# Patient Record
Sex: FEMALE | Race: BLACK OR AFRICAN AMERICAN | Employment: FULL TIME | URBAN - METROPOLITAN AREA
[De-identification: names, ages, dates, MRNs, and addresses within clinical notes are randomized per-mention and may not be internally consistent; named-entity substitution may affect disease eponyms.]

---

## 2019-06-18 ENCOUNTER — ANESTHESIA EVENT (OUTPATIENT)
Dept: ENDOSCOPY | Age: 39
DRG: 379 | End: 2019-06-18
Payer: COMMERCIAL

## 2019-06-18 ENCOUNTER — APPOINTMENT (OUTPATIENT)
Dept: GENERAL RADIOLOGY | Age: 39
DRG: 379 | End: 2019-06-18
Attending: INTERNAL MEDICINE
Payer: COMMERCIAL

## 2019-06-18 ENCOUNTER — HOSPITAL ENCOUNTER (INPATIENT)
Age: 39
LOS: 1 days | Discharge: HOME | DRG: 379 | End: 2019-06-21
Attending: EMERGENCY MEDICINE | Admitting: FAMILY MEDICINE
Payer: COMMERCIAL

## 2019-06-18 ENCOUNTER — ANESTHESIA (OUTPATIENT)
Dept: ENDOSCOPY | Age: 39
DRG: 379 | End: 2019-06-18
Payer: COMMERCIAL

## 2019-06-18 DIAGNOSIS — K92.2 UGI BLEED: Primary | ICD-10-CM

## 2019-06-18 PROBLEM — D72.829 LEUKOCYTOSIS: Status: ACTIVE | Noted: 2019-06-18

## 2019-06-18 PROBLEM — K92.0 COFFEE GROUND EMESIS: Status: ACTIVE | Noted: 2019-06-18

## 2019-06-18 LAB
ALBUMIN SERPL-MCNC: 3.9 G/DL (ref 3.5–5)
ALBUMIN/GLOB SERPL: 1 {RATIO} (ref 1.1–2.2)
ALP SERPL-CCNC: 60 U/L (ref 45–117)
ALT SERPL-CCNC: 21 U/L (ref 12–78)
ANION GAP SERPL CALC-SCNC: 3 MMOL/L (ref 5–15)
APPEARANCE UR: CLEAR
AST SERPL-CCNC: 13 U/L (ref 15–37)
ATRIAL RATE: 94 BPM
BACTERIA URNS QL MICRO: NEGATIVE /HPF
BASOPHILS # BLD: 0 K/UL (ref 0–0.1)
BASOPHILS NFR BLD: 0 % (ref 0–1)
BILIRUB SERPL-MCNC: 0.3 MG/DL (ref 0.2–1)
BILIRUB UR QL: NEGATIVE
BUN SERPL-MCNC: 25 MG/DL (ref 6–20)
BUN/CREAT SERPL: 39 (ref 12–20)
CALCIUM SERPL-MCNC: 9 MG/DL (ref 8.5–10.1)
CALCULATED P AXIS, ECG09: 49 DEGREES
CALCULATED R AXIS, ECG10: 21 DEGREES
CALCULATED T AXIS, ECG11: 33 DEGREES
CHLORIDE SERPL-SCNC: 110 MMOL/L (ref 97–108)
CO2 SERPL-SCNC: 29 MMOL/L (ref 21–32)
COLOR UR: ABNORMAL
CREAT SERPL-MCNC: 0.64 MG/DL (ref 0.55–1.02)
DIAGNOSIS, 93000: NORMAL
DIFFERENTIAL METHOD BLD: ABNORMAL
EOSINOPHIL # BLD: 0.1 K/UL (ref 0–0.4)
EOSINOPHIL NFR BLD: 0 % (ref 0–7)
EPITH CASTS URNS QL MICRO: ABNORMAL /LPF
ERYTHROCYTE [DISTWIDTH] IN BLOOD BY AUTOMATED COUNT: 12.8 % (ref 11.5–14.5)
GLOBULIN SER CALC-MCNC: 3.8 G/DL (ref 2–4)
GLUCOSE SERPL-MCNC: 115 MG/DL (ref 65–100)
GLUCOSE UR STRIP.AUTO-MCNC: NEGATIVE MG/DL
HCG UR QL: NEGATIVE
HCT VFR BLD AUTO: 34.8 % (ref 35–47)
HEMOCCULT STL QL: NEGATIVE
HGB BLD-MCNC: 11.7 G/DL (ref 11.5–16)
HGB UR QL STRIP: NEGATIVE
HYALINE CASTS URNS QL MICRO: ABNORMAL /LPF (ref 0–5)
IMM GRANULOCYTES # BLD AUTO: 0 K/UL (ref 0–0.04)
IMM GRANULOCYTES NFR BLD AUTO: 0 % (ref 0–0.5)
KETONES UR QL STRIP.AUTO: 40 MG/DL
LEUKOCYTE ESTERASE UR QL STRIP.AUTO: ABNORMAL
LIPASE SERPL-CCNC: 93 U/L (ref 73–393)
LYMPHOCYTES # BLD: 2.2 K/UL (ref 0.8–3.5)
LYMPHOCYTES NFR BLD: 16 % (ref 12–49)
MCH RBC QN AUTO: 28.7 PG (ref 26–34)
MCHC RBC AUTO-ENTMCNC: 33.6 G/DL (ref 30–36.5)
MCV RBC AUTO: 85.3 FL (ref 80–99)
MONOCYTES # BLD: 0.7 K/UL (ref 0–1)
MONOCYTES NFR BLD: 5 % (ref 5–13)
NEUTS SEG # BLD: 10.5 K/UL (ref 1.8–8)
NEUTS SEG NFR BLD: 79 % (ref 32–75)
NITRITE UR QL STRIP.AUTO: NEGATIVE
NRBC # BLD: 0 K/UL (ref 0–0.01)
NRBC BLD-RTO: 0 PER 100 WBC
P-R INTERVAL, ECG05: 168 MS
PH UR STRIP: 7 [PH] (ref 5–8)
PLATELET # BLD AUTO: 304 K/UL (ref 150–400)
PMV BLD AUTO: 10.2 FL (ref 8.9–12.9)
POTASSIUM SERPL-SCNC: 4.1 MMOL/L (ref 3.5–5.1)
PROT SERPL-MCNC: 7.7 G/DL (ref 6.4–8.2)
PROT UR STRIP-MCNC: ABNORMAL MG/DL
Q-T INTERVAL, ECG07: 360 MS
QRS DURATION, ECG06: 82 MS
QTC CALCULATION (BEZET), ECG08: 450 MS
RBC # BLD AUTO: 4.08 M/UL (ref 3.8–5.2)
RBC #/AREA URNS HPF: ABNORMAL /HPF (ref 0–5)
SODIUM SERPL-SCNC: 142 MMOL/L (ref 136–145)
SP GR UR REFRACTOMETRY: 1.02 (ref 1–1.03)
UR CULT HOLD, URHOLD: NORMAL
UROBILINOGEN UR QL STRIP.AUTO: 1 EU/DL (ref 0.2–1)
VENTRICULAR RATE, ECG03: 94 BPM
WBC # BLD AUTO: 13.5 K/UL (ref 3.6–11)
WBC URNS QL MICRO: ABNORMAL /HPF (ref 0–4)

## 2019-06-18 PROCEDURE — 74011250636 HC RX REV CODE- 250/636: Performed by: EMERGENCY MEDICINE

## 2019-06-18 PROCEDURE — C9113 INJ PANTOPRAZOLE SODIUM, VIA: HCPCS | Performed by: EMERGENCY MEDICINE

## 2019-06-18 PROCEDURE — 74011250636 HC RX REV CODE- 250/636: Performed by: INTERNAL MEDICINE

## 2019-06-18 PROCEDURE — 80053 COMPREHEN METABOLIC PANEL: CPT

## 2019-06-18 PROCEDURE — 99218 HC RM OBSERVATION: CPT

## 2019-06-18 PROCEDURE — 96361 HYDRATE IV INFUSION ADD-ON: CPT

## 2019-06-18 PROCEDURE — 81001 URINALYSIS AUTO W/SCOPE: CPT

## 2019-06-18 PROCEDURE — 82272 OCCULT BLD FECES 1-3 TESTS: CPT

## 2019-06-18 PROCEDURE — 94762 N-INVAS EAR/PLS OXIMTRY CONT: CPT

## 2019-06-18 PROCEDURE — 96374 THER/PROPH/DIAG INJ IV PUSH: CPT

## 2019-06-18 PROCEDURE — 85025 COMPLETE CBC W/AUTO DIFF WBC: CPT

## 2019-06-18 PROCEDURE — 74011250636 HC RX REV CODE- 250/636

## 2019-06-18 PROCEDURE — 74011250636 HC RX REV CODE- 250/636: Performed by: SPECIALIST

## 2019-06-18 PROCEDURE — 77030003657 HC NDL SCLER BSC -B: Performed by: SPECIALIST

## 2019-06-18 PROCEDURE — 83690 ASSAY OF LIPASE: CPT

## 2019-06-18 PROCEDURE — 77030004927 HC CATH ELECHEMSTAS BSC -C: Performed by: SPECIALIST

## 2019-06-18 PROCEDURE — 71045 X-RAY EXAM CHEST 1 VIEW: CPT

## 2019-06-18 PROCEDURE — 76060000031 HC ANESTHESIA FIRST 0.5 HR: Performed by: SPECIALIST

## 2019-06-18 PROCEDURE — 93005 ELECTROCARDIOGRAM TRACING: CPT

## 2019-06-18 PROCEDURE — 81025 URINE PREGNANCY TEST: CPT

## 2019-06-18 PROCEDURE — 86900 BLOOD TYPING SEROLOGIC ABO: CPT

## 2019-06-18 PROCEDURE — 36415 COLL VENOUS BLD VENIPUNCTURE: CPT

## 2019-06-18 PROCEDURE — 0W3P8ZZ CONTROL BLEEDING IN GASTROINTESTINAL TRACT, VIA NATURAL OR ARTIFICIAL OPENING ENDOSCOPIC: ICD-10-PCS | Performed by: SPECIALIST

## 2019-06-18 PROCEDURE — C9113 INJ PANTOPRAZOLE SODIUM, VIA: HCPCS | Performed by: INTERNAL MEDICINE

## 2019-06-18 PROCEDURE — 99285 EMERGENCY DEPT VISIT HI MDM: CPT

## 2019-06-18 PROCEDURE — 76040000019: Performed by: SPECIALIST

## 2019-06-18 RX ORDER — SODIUM CHLORIDE 0.9 % (FLUSH) 0.9 %
5-40 SYRINGE (ML) INJECTION AS NEEDED
Status: DISCONTINUED | OUTPATIENT
Start: 2019-06-18 | End: 2019-06-21 | Stop reason: HOSPADM

## 2019-06-18 RX ORDER — SODIUM CHLORIDE 9 MG/ML
INJECTION, SOLUTION INTRAVENOUS
Status: DISCONTINUED | OUTPATIENT
Start: 2019-06-18 | End: 2019-06-18 | Stop reason: HOSPADM

## 2019-06-18 RX ORDER — ACETAMINOPHEN 325 MG/1
650 TABLET ORAL
Status: DISCONTINUED | OUTPATIENT
Start: 2019-06-18 | End: 2019-06-21 | Stop reason: HOSPADM

## 2019-06-18 RX ORDER — FLUMAZENIL 0.1 MG/ML
0.2 INJECTION INTRAVENOUS
Status: DISCONTINUED | OUTPATIENT
Start: 2019-06-18 | End: 2019-06-18 | Stop reason: HOSPADM

## 2019-06-18 RX ORDER — EPINEPHRINE 0.1 MG/ML
INJECTION INTRACARDIAC; INTRAVENOUS
Status: DISCONTINUED
Start: 2019-06-18 | End: 2019-06-18

## 2019-06-18 RX ORDER — DEXLANSOPRAZOLE 60 MG/1
60 CAPSULE, DELAYED RELEASE ORAL
COMMUNITY
End: 2019-06-21

## 2019-06-18 RX ORDER — NALOXONE HYDROCHLORIDE 0.4 MG/ML
0.4 INJECTION, SOLUTION INTRAMUSCULAR; INTRAVENOUS; SUBCUTANEOUS AS NEEDED
Status: DISCONTINUED | OUTPATIENT
Start: 2019-06-18 | End: 2019-06-21 | Stop reason: HOSPADM

## 2019-06-18 RX ORDER — FENTANYL CITRATE 50 UG/ML
25 INJECTION, SOLUTION INTRAMUSCULAR; INTRAVENOUS AS NEEDED
Status: DISCONTINUED | OUTPATIENT
Start: 2019-06-18 | End: 2019-06-18 | Stop reason: HOSPADM

## 2019-06-18 RX ORDER — MINERAL OIL
180 ENEMA (ML) RECTAL DAILY
COMMUNITY

## 2019-06-18 RX ORDER — ONDANSETRON 4 MG/1
4 TABLET, ORALLY DISINTEGRATING ORAL
Status: DISCONTINUED | OUTPATIENT
Start: 2019-06-18 | End: 2019-06-21 | Stop reason: HOSPADM

## 2019-06-18 RX ORDER — MIDAZOLAM HYDROCHLORIDE 1 MG/ML
.25-5 INJECTION, SOLUTION INTRAMUSCULAR; INTRAVENOUS AS NEEDED
Status: DISCONTINUED | OUTPATIENT
Start: 2019-06-18 | End: 2019-06-18 | Stop reason: HOSPADM

## 2019-06-18 RX ORDER — DEXTROMETHORPHAN/PSEUDOEPHED 2.5-7.5/.8
1.2 DROPS ORAL
Status: DISCONTINUED | OUTPATIENT
Start: 2019-06-18 | End: 2019-06-18 | Stop reason: HOSPADM

## 2019-06-18 RX ORDER — PANTOPRAZOLE SODIUM 40 MG/10ML
40 INJECTION, POWDER, LYOPHILIZED, FOR SOLUTION INTRAVENOUS
Status: COMPLETED | OUTPATIENT
Start: 2019-06-18 | End: 2019-06-18

## 2019-06-18 RX ORDER — ACETAMINOPHEN 325 MG/1
325 TABLET ORAL
COMMUNITY

## 2019-06-18 RX ORDER — SODIUM CHLORIDE 9 MG/ML
100 INJECTION, SOLUTION INTRAVENOUS CONTINUOUS
Status: DISPENSED | OUTPATIENT
Start: 2019-06-18 | End: 2019-06-19

## 2019-06-18 RX ORDER — NALOXONE HYDROCHLORIDE 0.4 MG/ML
0.4 INJECTION, SOLUTION INTRAMUSCULAR; INTRAVENOUS; SUBCUTANEOUS
Status: DISCONTINUED | OUTPATIENT
Start: 2019-06-18 | End: 2019-06-18 | Stop reason: HOSPADM

## 2019-06-18 RX ORDER — PROPOFOL 10 MG/ML
INJECTION, EMULSION INTRAVENOUS
Status: DISCONTINUED | OUTPATIENT
Start: 2019-06-18 | End: 2019-06-18 | Stop reason: HOSPADM

## 2019-06-18 RX ORDER — SODIUM CHLORIDE 9 MG/ML
50 INJECTION, SOLUTION INTRAVENOUS CONTINUOUS
Status: DISPENSED | OUTPATIENT
Start: 2019-06-18 | End: 2019-06-18

## 2019-06-18 RX ORDER — PANTOPRAZOLE SODIUM 40 MG/10ML
80 INJECTION, POWDER, LYOPHILIZED, FOR SOLUTION INTRAVENOUS
Status: DISCONTINUED | OUTPATIENT
Start: 2019-06-18 | End: 2019-06-18

## 2019-06-18 RX ORDER — PROPOFOL 10 MG/ML
INJECTION, EMULSION INTRAVENOUS AS NEEDED
Status: DISCONTINUED | OUTPATIENT
Start: 2019-06-18 | End: 2019-06-18 | Stop reason: HOSPADM

## 2019-06-18 RX ORDER — LIDOCAINE HYDROCHLORIDE 20 MG/ML
INJECTION, SOLUTION EPIDURAL; INFILTRATION; INTRACAUDAL; PERINEURAL AS NEEDED
Status: DISCONTINUED | OUTPATIENT
Start: 2019-06-18 | End: 2019-06-18 | Stop reason: HOSPADM

## 2019-06-18 RX ORDER — EPINEPHRINE 0.1 MG/ML
1 INJECTION INTRACARDIAC; INTRAVENOUS AS NEEDED
Status: DISCONTINUED | OUTPATIENT
Start: 2019-06-18 | End: 2019-06-21 | Stop reason: HOSPADM

## 2019-06-18 RX ORDER — SODIUM CHLORIDE 0.9 % (FLUSH) 0.9 %
5-40 SYRINGE (ML) INJECTION EVERY 8 HOURS
Status: DISCONTINUED | OUTPATIENT
Start: 2019-06-18 | End: 2019-06-21 | Stop reason: HOSPADM

## 2019-06-18 RX ADMIN — PANTOPRAZOLE SODIUM 40 MG: 40 INJECTION, POWDER, FOR SOLUTION INTRAVENOUS at 20:52

## 2019-06-18 RX ADMIN — LIDOCAINE HYDROCHLORIDE 100 MG: 20 INJECTION, SOLUTION EPIDURAL; INFILTRATION; INTRACAUDAL; PERINEURAL at 16:41

## 2019-06-18 RX ADMIN — PROPOFOL 30 MG: 10 INJECTION, EMULSION INTRAVENOUS at 16:42

## 2019-06-18 RX ADMIN — EPINEPHRINE 5 MG: 0.1 INJECTION INTRACARDIAC; INTRAVENOUS at 16:58

## 2019-06-18 RX ADMIN — SODIUM CHLORIDE 125 ML/HR: 9 INJECTION, SOLUTION INTRAVENOUS at 13:58

## 2019-06-18 RX ADMIN — PROPOFOL 30 MG: 10 INJECTION, EMULSION INTRAVENOUS at 16:46

## 2019-06-18 RX ADMIN — SODIUM CHLORIDE: 9 INJECTION, SOLUTION INTRAVENOUS at 16:35

## 2019-06-18 RX ADMIN — PANTOPRAZOLE SODIUM 40 MG: 40 INJECTION, POWDER, FOR SOLUTION INTRAVENOUS at 13:21

## 2019-06-18 RX ADMIN — PROPOFOL 125 MCG/KG/MIN: 10 INJECTION, EMULSION INTRAVENOUS at 16:48

## 2019-06-18 RX ADMIN — PROPOFOL 30 MG: 10 INJECTION, EMULSION INTRAVENOUS at 16:44

## 2019-06-18 RX ADMIN — PROPOFOL 20 MG: 10 INJECTION, EMULSION INTRAVENOUS at 16:48

## 2019-06-18 RX ADMIN — PROPOFOL 100 MG: 10 INJECTION, EMULSION INTRAVENOUS at 16:40

## 2019-06-18 RX ADMIN — SODIUM CHLORIDE 1000 ML: 900 INJECTION, SOLUTION INTRAVENOUS at 13:21

## 2019-06-18 NOTE — PROCEDURES
[address] [place] [address], [place]  [phone]      2019    Esophagogastroduodenoscopy (EGD) Procedure Note  Madison Gauthier  : 1980  [facility] Medical Record Number: [id number]      Indications:    Hematemesis  Referring Physician:  Samira Wheeler MD  Anesthesia/Sedation:  Conscious sedation/deep sedation/monitored anesthesia -- see notes. Endoscopist:  Dr. Ricky Edmond  Complications:  None  Estimated Blood Loss:  None    Permit:  The indications, risks, benefits and alternatives were reviewed with the patient or their decision maker who was provided an opportunity to ask questions and all questions were answered. The specific risks of esophagogastroduodenoscopy with conscious sedation were reviewed, including but not limited to anesthetic complication, bleeding, adverse drug reaction, missed lesion, infection, IV site reactions, and intestinal perforation which would lead to the need for surgical repair. Alternatives to EGD including radiographic imaging, observation without testing, or laboratory testing were reviewed as well as the limitations of those alternatives discussed. After considering the options and having all their questions answered, the patient or their decision maker provided both verbal and written consent to proceed. Procedure in Detail:  After obtaining informed consent, positioning of the patient in the left lateral decubitus position, and conduction of a pre-procedure pause or \"time out\" the endoscope was introduced into the mouth and advanced to the duodenum. A careful inspection was made, and findings or interventions are described below. Findings:   Esophagus:normal  Stomach: Large quantity of hematin. No active bleeding. Duodenum/jejunum: Excavated ulcer in duodenal bulb, just past pylorus.   Active bleeding is arrested with injection of 5mL 1:10k epinephrine and application of bipolar cautery. Couldn't approach with clip because the ulcer is in tangential position. After epi and bi-cap hemostasis is noted. Specimens: none    Impression: DU with bleeding. Recommendations:  -NPO, IV PPI, serial H/H, transfuse PRN. I'll order testing for helicobacter. Addendum:  I ordered helicobacter serology, which in retrospect is not going to be helpful because she has known history of H pylori. She will need breath testing or stool testing for H pylori -- I did not biopsy for H pylori because of her bleeding and my not wanting to stimulate additional blood loss. Thank you for entrusting me with this patient's care. Please do not hesitate to contact me with any questions or if I can be of assistance with any of your other patients' GI needs. Signed By: Beverly Condon MD                        June 18, 2019     Surgical assistant none. Implants none unless specified.

## 2019-06-18 NOTE — CONSULTS
Campbell Andrade. Ozzie Castillo MD  [phone] office  [phone] voicemail   Gastroenterology Consultation Note      Admit Date: 2019  Consult Date: 2019   I greatly appreciate your asking me to see Reg Brunson, thank you very much for the opportunity to participate in her care. Narrative Assessment and Plan   · Hematemesis  · History of PUD  · History of H pylori. She's been admitted with IV PPI. I've reviewed indications, risks, benefits and alternatives to EGD and she's asked we proceed. Subjective:     Chief Complaint: Gastrointestinal Bleeding    History of Present Illness: Coffee ground emesis 1 cup today. No blood in stool. Normal vital signs, no syncope. Reports history of H Pylori, duodenal ulcer in past.      PCP:  Geoffrey Ocampo MD    Past Medical History:   Diagnosis Date    GERD (gastroesophageal reflux disease)     Stomach ulcer         Past Surgical History:   Procedure Laterality Date    HX  SECTION      x 2    HX ORTHOPAEDIC Left     torn mensicus repair       Social History     Tobacco Use    Smoking status: Never Smoker    Smokeless tobacco: Never Used   Substance Use Topics    Alcohol use: No     Comment: ocassional        History reviewed. No pertinent family history. Allergies   Allergen Reactions    Azithromycin Itching    Nuts [Tree Nut] Hives    Pcn [Penicillins] Hives and Nausea and Vomiting            Home Medications:  Prior to Admission Medications   Prescriptions Last Dose Informant Patient Reported? Taking?   acetaminophen (TYLENOL) 325 mg tablet 2019 Self Yes Yes   Sig: Take 325 mg by mouth every six (6) hours as needed for Pain. dexlansoprazole (DEXILANT) 60 mg CpDB capsule (delayed release) 2019 at Unknown time Self Yes Yes   Sig: Take 60 mg by mouth Daily (before breakfast).  Indications: stomach ulcer   fexofenadine (ALLEGRA) 180 mg tablet 2019 at Unknown time Self Yes Yes   Sig: Take 180 mg by mouth daily. Facility-Administered Medications: None       Hospital Medications:  Current Facility-Administered Medications   Medication Dose Route Frequency    pantoprazole (PROTONIX) 40 mg in sodium chloride 0.9% 10 mL injection  40 mg IntraVENous Q12H    0.9% sodium chloride infusion  125 mL/hr IntraVENous CONTINUOUS    sodium chloride (NS) flush 5-40 mL  5-40 mL IntraVENous Q8H    sodium chloride (NS) flush 5-40 mL  5-40 mL IntraVENous PRN    acetaminophen (TYLENOL) tablet 650 mg  650 mg Oral Q4H PRN    naloxone (NARCAN) injection 0.4 mg  0.4 mg IntraVENous PRN    ondansetron (ZOFRAN ODT) tablet 4 mg  4 mg Oral Q4H PRN       Review of Systems: Admission ROS by Denis Moctezuma MD from 6/18/2019 were reviewed with the patient and changes (other than per HPI) include: none      Objective:     Physical Exam:  Visit Vitals  /64   Pulse 100   Temp 98.9 °F (37.2 °C)   Resp 24   Ht 5' 5\" (1.651 m)   Wt 108.9 kg (240 lb)   SpO2 100%   Breastfeeding? No   BMI 39.94 kg/m²     SpO2 Readings from Last 6 Encounters:   06/18/19 100%   05/27/15 99%        No intake or output data in the 24 hours ending 06/18/19 1616   General: no distress, comfortable  Skin:  No rash or palpable dermatologic mass lesions  HEENT: Pupils equal, sclera anicteric, oropharynx with no gross lesions  Cardiovascular: No abnormal audible heart sounds, well perfused, no edema  Respiratory:  No abnormal audible breath sounds, normal respiratory effort, no throacic deformity  GI:  , Abdomen nondistended, nontender, no mass, no free fluid, no rebound or guarding. Musculoskeletal:  No skeletal deformity nor acute arthritis noted.   Neurological:  Motor and sensory function intact in upper extremeties  Psychiatric:  Normal affect, memory intact, appears to have insight into current illness  Lymphatic:  No cervical, supraclavicular, or periumbilic lymphadenopathy    Laboratory:    Recent Results (from the past 24 hour(s))   CBC WITH AUTOMATED DIFF    Collection Time: 06/18/19  1:05 PM   Result Value Ref Range    WBC 13.5 (H) 3.6 - 11.0 K/uL    RBC 4.08 3.80 - 5.20 M/uL    HGB 11.7 11.5 - 16.0 g/dL    HCT 34.8 (L) 35.0 - 47.0 %    MCV 85.3 80.0 - 99.0 FL    MCH 28.7 26.0 - 34.0 PG    MCHC 33.6 30.0 - 36.5 g/dL    RDW 12.8 11.5 - 14.5 %    PLATELET 237 432 - 523 K/uL    MPV 10.2 8.9 - 12.9 FL    NRBC 0.0 0  WBC    ABSOLUTE NRBC 0.00 0.00 - 0.01 K/uL    NEUTROPHILS 79 (H) 32 - 75 %    LYMPHOCYTES 16 12 - 49 %    MONOCYTES 5 5 - 13 %    EOSINOPHILS 0 0 - 7 %    BASOPHILS 0 0 - 1 %    IMMATURE GRANULOCYTES 0 0.0 - 0.5 %    ABS. NEUTROPHILS 10.5 (H) 1.8 - 8.0 K/UL    ABS. LYMPHOCYTES 2.2 0.8 - 3.5 K/UL    ABS. MONOCYTES 0.7 0.0 - 1.0 K/UL    ABS. EOSINOPHILS 0.1 0.0 - 0.4 K/UL    ABS. BASOPHILS 0.0 0.0 - 0.1 K/UL    ABS. IMM. GRANS. 0.0 0.00 - 0.04 K/UL    DF AUTOMATED     METABOLIC PANEL, COMPREHENSIVE    Collection Time: 06/18/19  1:05 PM   Result Value Ref Range    Sodium 142 136 - 145 mmol/L    Potassium 4.1 3.5 - 5.1 mmol/L    Chloride 110 (H) 97 - 108 mmol/L    CO2 29 21 - 32 mmol/L    Anion gap 3 (L) 5 - 15 mmol/L    Glucose 115 (H) 65 - 100 mg/dL    BUN 25 (H) 6 - 20 MG/DL    Creatinine 0.64 0.55 - 1.02 MG/DL    BUN/Creatinine ratio 39 (H) 12 - 20      GFR est AA >60 >60 ml/min/1.73m2    GFR est non-AA >60 >60 ml/min/1.73m2    Calcium 9.0 8.5 - 10.1 MG/DL    Bilirubin, total 0.3 0.2 - 1.0 MG/DL    ALT (SGPT) 21 12 - 78 U/L    AST (SGOT) 13 (L) 15 - 37 U/L    Alk.  phosphatase 60 45 - 117 U/L    Protein, total 7.7 6.4 - 8.2 g/dL    Albumin 3.9 3.5 - 5.0 g/dL    Globulin 3.8 2.0 - 4.0 g/dL    A-G Ratio 1.0 (L) 1.1 - 2.2     LIPASE    Collection Time: 06/18/19  1:05 PM   Result Value Ref Range    Lipase 93 73 - 393 U/L   URINALYSIS W/MICROSCOPIC    Collection Time: 06/18/19  1:05 PM   Result Value Ref Range    Color YELLOW/STRAW      Appearance CLEAR CLEAR      Specific gravity 1.022 1.003 - 1.030      pH (UA) 7.0 5.0 - 8.0 Protein TRACE (A) NEG mg/dL    Glucose NEGATIVE  NEG mg/dL    Ketone 40 (A) NEG mg/dL    Bilirubin NEGATIVE  NEG      Blood NEGATIVE  NEG      Urobilinogen 1.0 0.2 - 1.0 EU/dL    Nitrites NEGATIVE  NEG      Leukocyte Esterase SMALL (A) NEG      WBC 5-10 0 - 4 /hpf    RBC 0-5 0 - 5 /hpf    Epithelial cells FEW FEW /lpf    Bacteria NEGATIVE  NEG /hpf    Hyaline cast 0-2 0 - 5 /lpf   URINE CULTURE HOLD SAMPLE    Collection Time: 06/18/19  1:05 PM   Result Value Ref Range    Urine culture hold        URINE ON HOLD IN MICROBIOLOGY DEPT FOR 3 DAYS. IF UNPRESERVED URINE IS SUBMITTED, IT CANNOT BE USED FOR ADDITIONAL TESTING AFTER 24 HRS, RECOLLECTION WILL BE REQUIRED. TYPE & SCREEN    Collection Time: 06/18/19  1:05 PM   Result Value Ref Range    Crossmatch Expiration 06/21/2019     ABO/Rh(D) A POSITIVE     Antibody screen NEG    HCG URINE, QL. - POC    Collection Time: 06/18/19  1:21 PM   Result Value Ref Range    Pregnancy test,urine (POC) NEGATIVE  NEG     OCCULT BLOOD, STOOL    Collection Time: 06/18/19  1:42 PM   Result Value Ref Range    Occult blood, stool NEGATIVE  NEG     EKG, 12 LEAD, INITIAL    Collection Time: 06/18/19  2:23 PM   Result Value Ref Range    Ventricular Rate 94 BPM    Atrial Rate 94 BPM    P-R Interval 168 ms    QRS Duration 82 ms    Q-T Interval 360 ms    QTC Calculation (Bezet) 450 ms    Calculated P Axis 49 degrees    Calculated R Axis 21 degrees    Calculated T Axis 33 degrees    Diagnosis       Normal sinus rhythm  Normal ECG  No previous ECGs available           Assessment/Plan:     Principal Problem:    Coffee ground emesis (6/18/2019)    Active Problems:    Leukocytosis (6/18/2019)         See above narrative for full detail.

## 2019-06-18 NOTE — PERIOP NOTES
TRANSFER - OUT REPORT:    Verbal report given to Ana Rosa Vieira  on [facility]  being transferred to [address] [place] [address] (unit) for routine progression of care       Report consisted of patients Situation, Background, Assessment and   Recommendations(SBAR). Information from the following report(s) SBAR, Procedure Summary and Recent Results was reviewed with the receiving nurse. Lines:   Peripheral IV 06/18/19 Left Antecubital (Active)   Site Assessment Clean, dry, & intact 6/18/2019  3:23 PM   Phlebitis Assessment 0 6/18/2019  3:23 PM   Infiltration Assessment 0 6/18/2019  3:23 PM   Dressing Status Clean, dry, & intact 6/18/2019  3:23 PM   Dressing Type Tape;Transparent 6/18/2019  3:23 PM   Hub Color/Line Status Pink; Infusing;Patent 6/18/2019  3:23 PM   Action Taken Open ports on tubing capped 6/18/2019  3:23 PM   Alcohol Cap Used Yes 6/18/2019  3:23 PM        Opportunity for questions and clarification was provided.       Patient transported with:   [employer]

## 2019-06-18 NOTE — ROUTINE PROCESS
[facility] 1980 
[id number] Situation: 
Verbal report received from: Nellie Bradshaw RN Procedure: Procedure(s): ESOPHAGOGASTRODUODENOSCOPY (EGD) INJECTION W/ EPINEPHRINE 
BICAP Background: 
 
Preoperative diagnosis: Gastrointestinal hemorrhage, unspecified gastrointestinal hemorrhage type [K92.2] Postoperative diagnosis: duodenal ulcer :  Dr. Kaya Ghosh Assistant(s): Endoscopy Technician-1: Yohannes Barragan Endoscopy RN-1: Thao Schaefer RN Specimens: * No specimens in log * H. Pylori  no Assessment: 
Intra-procedure medications Anesthesia gave intra-procedure sedation and medications, see anesthesia flow sheet yes Intravenous fluids: NS@ [facility] Vital signs stable Abdominal assessment: round and soft Recommendation: 
Discharge patient per MD order. Return to floor Family or Friend Permission to share finding with family or friend yes

## 2019-06-18 NOTE — ED NOTES
TRANSFER - OUT REPORT:    Verbal report given to Ophelia RN and SERGIO Palacios(name) on Tyree Bolaños  being transferred to 5th floor(unit) for routine progression of care       Report consisted of patients Situation, Background, Assessment and   Recommendations(SBAR). Information from the following report(s) SBAR, Kardex, ED Summary, [facility] and Recent Results was reviewed with the receiving nurse. Lines:   Peripheral IV 06/18/19 Left Antecubital (Active)   Site Assessment Clean, dry, & intact 6/18/2019  1:08 PM   Phlebitis Assessment 0 6/18/2019  1:08 PM   Infiltration Assessment 0 6/18/2019  1:08 PM   Dressing Status Clean, dry, & intact 6/18/2019  1:08 PM   Dressing Type Tape;Transparent 6/18/2019  1:08 PM   Hub Color/Line Status Pink;Flushed;Patent 6/18/2019  1:08 PM   Action Taken Blood drawn 6/18/2019  1:08 PM        Opportunity for questions and clarification was provided.       Patient transported with:   Monitor  Registered Nurse

## 2019-06-18 NOTE — PERIOP NOTES
Patient resting comfortably on stretcher, HOB elevated, VS stable, call bell within reach, friend at bedside, waiting for procedure start, will continue to monitor pt.

## 2019-06-18 NOTE — H&P
[address]  [phone]    Admission History and Physical      NAME:  Anita Solorzano   :   1980   MRN:  [id number]     PCP:  Marc Cooley MD     Date/Time:  2019         Subjective:     CHIEF COMPLAINT: \"I vomited coffee ground looking vomit\"     HISTORY OF PRESENT ILLNESS:     Ms. Mary Murillo is a 43 y.o. female with PMH of PUD x 2 (1st episode neg for H.pylori, 2nd episode (+)) admitted for coffee ground emesis. Per pt, at roughly 11AM had coffee ground emesis x 1. Has not noted melanotic stools. Denies NSAID use, steroids, caffeine, alcohol use. Currently no epigastric pain. Past Medical History:   Diagnosis Date    Stomach ulcer         No past surgical history on file. Social History     Tobacco Use    Smoking status: Never Smoker   Substance Use Topics    Alcohol use: No      FH:  HTN     Allergies   Allergen Reactions    Azithromycin Itching    Nuts [Tree Nut] Hives    Pcn [Penicillins] Hives and Nausea and Vomiting        Prior to Admission medications    Medication Sig Start Date End Date Taking? Authorizing Provider   dexlansoprazole (DEXILANT) 60 mg CpDB capsule (delayed release) Take 60 mg by mouth Daily (before breakfast). Indications: stomach ulcer   Yes Provider, Historical   fexofenadine (ALLEGRA) 180 mg tablet Take 180 mg by mouth daily. Yes Provider, Historical   acetaminophen (TYLENOL) 325 mg tablet Take 325 mg by mouth every six (6) hours as needed for Pain.    Yes Provider, Historical         Review of Systems:  (bold if positive, if negative)    Gen:  Eyes:  ENT:  CVS:  Pulm:  GI:    :    MS:  Skin:  Psych:  Endo:    Hem:  Renal:    Neuro:     Coffee ground emesis        Objective:      VITALS:    Vital signs reviewed; most recent are:    Visit Vitals  BP 92/58   Pulse 91   Temp 98.6 °F (37 °C)   Resp 24   Ht 5' 5\" (1.651 m)   Wt 108.9 kg (240 lb)   SpO2 100%   BMI 39.94 kg/m²     SpO2 Readings from Last 6 Encounters:   06/18/19 100%   05/27/15 99%        No intake or output data in the 24 hours ending 06/18/19 1509         Exam:     Physical Exam:    Gen:  Well-developed, well-nourished, in no acute distress  HEENT:  Pink conjunctivae, PERRL, hearing intact to voice, moist mucous membranes  Neck:  Supple, without masses, thyroid non-tender  Resp:  No accessory muscle use, clear breath sounds without wheezes rales or rhonchi  Card: Tachycardic. No murmurs, normal S1, S2 without thrills, bruits or peripheral edema  Abd:  Soft, non-tender, non-distended, normoactive bowel sounds are present, no palpable organomegaly  Lymph:  No cervical adenopathy  Musc:  No cyanosis or clubbing  Skin:  No rashes or ulcers, skin turgor is good  Neuro:  Cranial nerves 3-12 are grossly intact,  strength is 5/5 bilaterally, dorsi / plantarflexion strength is 5/5 bilaterally, follows commands appropriately  Psych:  Alert with good insight. Oriented to person, place, and time       Labs:    Recent Labs     06/18/19  1305   WBC 13.5*   HGB 11.7   HCT 34.8*        Recent Labs     06/18/19  1305      K 4.1   *   CO2 29   *   BUN 25*   CREA 0.64   CA 9.0   ALB 3.9   SGOT 13*   ALT 21     No components found for: GLPOC  No results for input(s): PH, PCO2, PO2, HCO3, FIO2 in the last 72 hours. No results for input(s): INR in the last 72 hours. No lab exists for component: INREXT         Assessment/Plan:     Coffee ground emesis (6/18/2019) - with h/o PUD likely recurrence (this would be her third recurrence)   -start IV PPI BID   -type and screen; serial hemoglobin   -IVF's       Leukocytosis (6/18/2019) - reactive? No clear source of infection   -UA not c/w UTI   -check CXR in the event that pt may have aspirated some vomitus though less likely   -monitor with hydration     PUD - h/o.  Likely with recurrence  -IV PPI BID for now   -GI as above     Surrogate decision maker:      Total time spent with patient: [phone] [address] discussed with: Patient and Family    Discussed:  Code Status, Care Plan and D/C Planning    Prophylaxis:  SCD's    Probable Disposition:  Home w/Family           ___________________________________________________    Attending Physician: Felisha Dawn MD

## 2019-06-18 NOTE — ANESTHESIA PREPROCEDURE EVALUATION
Relevant Problems   No relevant active problems       Anesthetic History   No history of anesthetic complications            Review of Systems / Medical History  Patient summary reviewed, nursing notes reviewed and pertinent labs reviewed    Pulmonary  Within defined limits                 Neuro/Psych   Within defined limits           Cardiovascular  Within defined limits                     GI/Hepatic/Renal     GERD      PUD     Endo/Other  Within defined limits           Other Findings   Comments: Hg=11.7         Physical Exam    Airway  Mallampati: III  TM Distance: 4 - 6 cm  Neck ROM: normal range of motion   Mouth opening: Normal     Cardiovascular    Rhythm: regular  Rate: normal         Dental    Dentition: Lower dentition intact, Upper dentition intact and Caps/crowns     Pulmonary  Breath sounds clear to auscultation               Abdominal  GI exam deferred       Other Findings            Anesthetic Plan    ASA: 2  Anesthesia type: MAC          Induction: Intravenous  Anesthetic plan and risks discussed with: Patient

## 2019-06-18 NOTE — PROGRESS NOTES
[facility]: MED RECONCILIATION    Comments/Recommendations:   Med rec performed via interview with patient who was a good historian. Confirmed patient's preferred pharmacy. Medications added:     Dexilant  Apap prn  allegra    Medications removed:    Nexium  zofran    Information obtained from: patient, rx query    Significant PMH/Disease States:   Past Medical History:   Diagnosis Date    Stomach ulcer        Chief Complaint for this Admission:   Chief Complaint   Patient presents with    Nausea    Blood in Vomit       Allergies: Nuts [tree nut] and Pcn [penicillins]    Prior to Admission Medications:     Medication Documentation Review Audit       Reviewed by Khadra Ray (Pharmacist) on 06/18/19 at 1416      Medication Sig Documenting Provider Last Dose Status Taking?   acetaminophen (TYLENOL) 325 mg tablet Take 325 mg by mouth every six (6) hours as needed for Pain. Provider, Historical 6/17/2019 Active Yes   dexlansoprazole (DEXILANT) 60 mg CpDB capsule (delayed release) Take 60 mg by mouth Daily (before breakfast). Indications: stomach ulcer Provider, Historical 6/18/2019 Unknown time Active Yes   fexofenadine (ALLEGRA) 180 mg tablet Take 180 mg by mouth daily.  Provider, Historical 6/17/2019 Unknown time Active Yes                        Khadra Dickerson   Contact: 1122

## 2019-06-18 NOTE — PERIOP NOTES
Received Report From Meena Geronimo CRNA @ 8921, see anesthesia notes. Care of the patient transferred to procedure nurse Shanthi Be RN @ 5101    Out of Procedure and sent to post-recovery @ 1143    Post-recovery report given to [facility] @ 3882    Patient ABD remains soft and non-tender post procedure. Pt has no complaints at this time and tolerated the procedure well. Endoscope was pre-cleaned at bedside immediately following procedure by Debo Riley.

## 2019-06-18 NOTE — PROGRESS NOTES
6/18/2019  2:25 PM  Case management note    Reason for Admission:   Coffee brown emesis    Patient came to ED after feeling bad this am and starting vomiting brown emesis. She is independent with ADL's. She lives with  and have about 5 steps to enter. [facility]  [place]  Patient in obs, notification to done                   RRAT Score:         0            Plan for utilizing home health: To be determined by PT                    Current Advanced Directive/Advance Care Plan: does not have                         Transition of Care Plan:         1. Home with family assistance  2. GI follow up  3. PCP follow up  4.  CM to follow until discharge    Care Management Interventions  PCP Verified by CM: Yes(angélica lopes no nn)  Mode of Transport at Discharge: Self  Transition of Care Consult (CM Consult): Discharge Planning  Current Support Network: Lives with Spouse  Confirm Follow Up Transport: Family  Plan discussed with Pt/Family/Caregiver: Yes  Discharge Location  Discharge Placement: Home with family assistance     [place], [place]

## 2019-06-18 NOTE — ED NOTES
Patient Throughput:  Charge nurse on 5th floor made aware of patient's room assignment, room [address], [address] [facility] Resource Nurse  Emergency Department

## 2019-06-18 NOTE — ED TRIAGE NOTES
The patient states she did not feel well earlier this morning with nausea and went home and vomited dark brown emesis once. Denies abdominal pain. Has history of gastric ulcer.

## 2019-06-18 NOTE — ANESTHESIA POSTPROCEDURE EVALUATION
Procedure(s):  ESOPHAGOGASTRODUODENOSCOPY (EGD)  INJECTION W/ EPINEPHRINE  BICAP. MAC    Anesthesia Post Evaluation        Patient location during evaluation: PACU  Level of consciousness: awake  Pain management: adequate  Airway patency: patent  Anesthetic complications: no  Cardiovascular status: acceptable  Respiratory status: acceptable  Hydration status: acceptable  Post anesthesia nausea and vomiting:  none      Vitals Value Taken Time   BP 77/49 6/18/2019  5:34 PM   Temp     Pulse 91 6/18/2019  5:36 PM   Resp 15 6/18/2019  5:36 PM   SpO2 97 % 6/18/2019  5:37 PM   Vitals shown include unvalidated device data.

## 2019-06-18 NOTE — ED PROVIDER NOTES
I have evaluated the patient as the Provider in Triage. I have reviewed Her vital signs and the triage nurse assessment. I have talked with the patient and any available family and advised that I am the provider in triage and have ordered the appropriate study to initiate their work up based on the clinical presentation during my assessment. I have advised that the patient will be accommodated in the Main ED as soon as possible. I have also requested to contact the triage nurse or myself immediately if the patient experiences any changes in their condition during this brief waiting period. Pt reports nausea since this morning. Pt states she did not feel well at work. Pt notes an episode of dark brown emesis once she arrived home from work. Pt is taking nexium  Pt reports previous hx of stomach ulcer. Pt notes she has received an endoscopy in the past. Pt denies hx of liver issues. Pt denies taking any ibuprofen or aspirin. Pt denies taking any anticoagulants. Pt denies diarrhea or abdominal pain. Social hx: Occasional alcohol use. Note written by Jesus Kay, as dictated by Shadia Espinosa MD 12:51 PM    43 y.o. female with past medical history significant for duodenal ulcer who presents to the ED with chief complaint of hematemesis. Pt reports she was not feeling well this morning at work with progressively worsening nausea so she went home from work early a couple hours ago and began vomiting. Pt reports she had one episode of hematemesis, says she had about 3-4 cups of dark colored emesis that smelled like blood. Pt states she also feels weak. Pt states she has not eaten anything today but has been drinking water and ginger ale. Pt states she has hx of a GI bleed about 10 years ago with similar sx, says she was dx with a duodenal ulcer and required blood transfusions at that time. Pt denies blood in stool, epistaxis, or hemoptysis.  There are no other acute medical complaints voiced at this time. Social Hx: Never smoker. Denies EtOH use. PCP: Matheus Clark MD  Gastroenterology: Dr. Myron Payton    Note written by Jesus Christianson, as dictated by Leonel Remy MD 1:39 PM     The history is provided by the patient and the spouse. No  was used. Past Medical History:   Diagnosis Date    Stomach ulcer        No past surgical history on file. No family history on file. Social History     Socioeconomic History    Marital status:      Spouse name: Not on file    Number of children: Not on file    Years of education: Not on file    Highest education level: Not on file   Occupational History    Not on file   Social Needs    Financial resource strain: Not on file    Food insecurity:     Worry: Not on file     Inability: Not on file    Transportation needs:     Medical: Not on file     Non-medical: Not on file   Tobacco Use    Smoking status: Never Smoker   Substance and Sexual Activity    Alcohol use: No    Drug use: Not on file    Sexual activity: Not on file   Lifestyle    Physical activity:     Days per week: Not on file     Minutes per session: Not on file    Stress: Not on file   Relationships    Social connections:     Talks on phone: Not on file     Gets together: Not on file     Attends [employer] service: Not on file     Active member of club or organization: Not on file     Attends meetings of clubs or organizations: Not on file     Relationship status: Not on file    Intimate partner violence:     Fear of current or ex partner: Not on file     Emotionally abused: Not on file     Physically abused: Not on file     Forced sexual activity: Not on file   Other Topics Concern    Not on file   Social History Narrative    Not on file         ALLERGIES: Nuts [tree nut] and Pcn [penicillins]    Review of Systems   Constitutional: Negative for fever. HENT: Negative for nosebleeds. Eyes: Negative for visual disturbance. Respiratory: Negative for cough, shortness of breath and wheezing. Cardiovascular: Negative for chest pain and leg swelling. Gastrointestinal: Positive for nausea and vomiting. Negative for abdominal pain, blood in stool and diarrhea.        +hematemesis   Genitourinary: Negative for dysuria. Musculoskeletal: Negative. Negative for back pain and neck stiffness. Skin: Negative for rash. Neurological: Positive for weakness. Negative for syncope and headaches. Psychiatric/Behavioral: Negative for confusion. All other systems reviewed and are negative. Vitals:    06/18/19 1251   BP: 113/69   Pulse: (!) 121   Resp: 18   Temp: 98.6 °F (37 °C)   SpO2: 98%   Weight: 108.9 kg (240 lb)   Height: 5' 5\" (1.651 m)            Physical Exam   Constitutional: She appears well-developed and well-nourished. No distress. HENT:   Head: Normocephalic. Eyes: Pupils are equal, round, and reactive to light. Neck: Normal range of motion. Cardiovascular: Tachycardia present. No murmur heard. Pulmonary/Chest: Effort normal and breath sounds normal.   Abdominal: Soft. There is no tenderness. Musculoskeletal: Normal range of motion. Neurological: She is alert. Skin: Skin is warm and dry. Capillary refill takes less than 2 seconds. Psychiatric: She has a normal mood and affect. Her behavior is normal.   Nursing note and vitals reviewed. Note written by Jesus Koehler, as dictated by Lorie Gilford, MD 1:40 PM    MDM       Procedures    CONSULT NOTE:  2:03 PM Lorie Gilford, MD communicated with Dr. Sarah De Luna, Consult for Hospitalist via [facility] Text. Discussed available diagnostic tests and clinical findings. Dr. Sarah De Luna will admit pt. Recommends consulting GI. ED EKG interpretation:  Rhythm: normal sinus rhythm; and regular . Rate (approx.): 94; Axis: normal; ST/T wave: no acute changes.      Note written by Jesus Koehler, as dictated by Lorie Gilford, MD 2:23 PM

## 2019-06-19 LAB
ABO + RH BLD: NORMAL
ANION GAP SERPL CALC-SCNC: 7 MMOL/L (ref 5–15)
BLOOD GROUP ANTIBODIES SERPL: NORMAL
BUN SERPL-MCNC: 22 MG/DL (ref 6–20)
BUN/CREAT SERPL: 39 (ref 12–20)
CALCIUM SERPL-MCNC: 8.6 MG/DL (ref 8.5–10.1)
CHLORIDE SERPL-SCNC: 112 MMOL/L (ref 97–108)
CO2 SERPL-SCNC: 25 MMOL/L (ref 21–32)
CREAT SERPL-MCNC: 0.56 MG/DL (ref 0.55–1.02)
GLUCOSE SERPL-MCNC: 103 MG/DL (ref 65–100)
HGB BLD-MCNC: 9.3 G/DL (ref 11.5–16)
MAGNESIUM SERPL-MCNC: 2.1 MG/DL (ref 1.6–2.4)
POTASSIUM SERPL-SCNC: 3.5 MMOL/L (ref 3.5–5.1)
SODIUM SERPL-SCNC: 144 MMOL/L (ref 136–145)
SPECIMEN EXP DATE BLD: NORMAL

## 2019-06-19 PROCEDURE — C9113 INJ PANTOPRAZOLE SODIUM, VIA: HCPCS | Performed by: INTERNAL MEDICINE

## 2019-06-19 PROCEDURE — 83735 ASSAY OF MAGNESIUM: CPT

## 2019-06-19 PROCEDURE — 74011250636 HC RX REV CODE- 250/636: Performed by: INTERNAL MEDICINE

## 2019-06-19 PROCEDURE — 80048 BASIC METABOLIC PNL TOTAL CA: CPT

## 2019-06-19 PROCEDURE — 86677 HELICOBACTER PYLORI ANTIBODY: CPT

## 2019-06-19 PROCEDURE — 99218 HC RM OBSERVATION: CPT

## 2019-06-19 PROCEDURE — 36415 COLL VENOUS BLD VENIPUNCTURE: CPT

## 2019-06-19 PROCEDURE — 85018 HEMOGLOBIN: CPT

## 2019-06-19 RX ADMIN — PANTOPRAZOLE SODIUM 40 MG: 40 INJECTION, POWDER, FOR SOLUTION INTRAVENOUS at 08:45

## 2019-06-19 RX ADMIN — Medication 10 ML: at 21:51

## 2019-06-19 RX ADMIN — Medication 10 ML: at 14:00

## 2019-06-19 RX ADMIN — PANTOPRAZOLE SODIUM 40 MG: 40 INJECTION, POWDER, FOR SOLUTION INTRAVENOUS at 21:51

## 2019-06-19 NOTE — PROGRESS NOTES
Gastroenterology Progress Note    June 19, 2019  Admit Date: 6/18/2019         Narrative Assessment and Plan   · Hematemesis - resolved  · Bleeding duodenal ulcer s/p EGD with epinephrine injection and cautery  · Anemia - Hgb 9.3, BUN improving    Plan  - H pylori stool antigen pending  - continue IV PPI  - monitor H/H and transfuse as needed  - will discuss with Dr. Bryant Wei before starting liquids  Tran Mert Wei MD  [phone] office  [phone] voicemail   I have personally reviewed the history, interviewed the patient, and independently examined the patient. I have reviewed the chart and agree with the documentation recorded by the Mid Level Provider, including the assessment, treatment plan, and disposition; with the addition of:  No BM, no vomiting. However, BUN still elevated (but improved). Hgb down, expected. Not at transfusion threshold (7)    PUD, active bleeding treated with combination endoscopic hemostasis; rebleeding risk significant. Suggest ongoing parenteral PPI, ok for clears, serial h/h, transfuse if <7.  If rebleeding will have to consider another attempt at endoscopic hemostasis or if unstable then consider surgical/angiographic control. At current this is not indicated. Gilbert Bassett MD        Subjective:   · Patient states that she is feeling better today. She feels some soreness in upper abdomen with coughing but otherwise okay. Denies nausea or vomiting. No melena or hematochezia. ROS:  The previous review of systems on initial consultation / H&P is noted and reviewed. Specific changes noted above in HPI.     Current Medications:     Current Facility-Administered Medications   Medication Dose Route Frequency    pantoprazole (PROTONIX) 40 mg in sodium chloride 0.9% 10 mL injection  40 mg IntraVENous Q12H    0.9% sodium chloride infusion  100 mL/hr IntraVENous CONTINUOUS    sodium chloride (NS) flush 5-40 mL  5-40 mL IntraVENous Q8H    sodium chloride (NS) flush 5-40 mL  5-40 mL IntraVENous PRN    acetaminophen (TYLENOL) tablet 650 mg  650 mg Oral Q4H PRN    naloxone (NARCAN) injection 0.4 mg  0.4 mg IntraVENous PRN    ondansetron (ZOFRAN ODT) tablet 4 mg  4 mg Oral Q4H PRN    EPINEPHrine (ADRENALIN) 0.1 mg/mL syringe 1 mg  1 mg IntraVENous PRN       Objective:     VITALS:   Last 24hrs VS reviewed since prior progress note. Most recent are:  Visit Vitals  /63 (BP 1 Location: Right arm, BP Patient Position: At rest)   Pulse 97   Temp 98 °F (36.7 °C)   Resp 18   Ht 5' 5\" (1.651 m)   Wt 108.9 kg (240 lb)   SpO2 96%   Breastfeeding? No   BMI 39.94 kg/m²     Temp (24hrs), Av.6 °F (37 °C), Min:98 °F (36.7 °C), Max:99.5 °F (37.5 °C)      Intake/Output Summary (Last 24 hours) at 2019 1053  Last data filed at 2019 1700  Gross per 24 hour   Intake 250 ml   Output    Net 250 ml       EXAM:  General:          Comfortable, no distress    HEENT: Atraumatic skull, pupils equal  Lungs:  No abnormal audible breath sounds. Speaking in complete sentences  Heart:  No abnormal audible heart sounds. Well perfused  Abdomen: Nondistended, nontender. No mass, guarding or rebound  Neurologic:  Cranial nerves grossly intact, moves all 4 extremities  Psych:   Good insight. Not anxious nor agitated    Lab Data Reviewed:   Recent Labs     19  0254 19  1305   WBC  --  13.5*   HGB 9.3* 11.7   HCT  --  34.8*   PLT  --  304     Recent Labs     19  0254 19  1305    142   K 3.5 4.1   * 110*   CO2 25 29   * 115*   BUN 22* 25*   CREA 0.56 0.64   CA 8.6 9.0   MG 2.1  --    ALB  --  3.9   TBILI  --  0.3   SGOT  --  13*   ALT  --  21     No results found for: GLUCPOC  No results for input(s): PH, PCO2, PO2, HCO3, FIO2 in the last 72 hours. No results for input(s): INR in the last 72 hours.     No lab exists for component: INREXT        Assessment:   (See above)  Principal Problem:    Coffee ground emesis (6/18/2019)    Active Problems:    Leukocytosis (6/18/2019)        Plan:   (See above)    Signed By:  Alisha Hall PA-C  6/19/2019  10:53 AM

## 2019-06-19 NOTE — ROUTINE PROCESS
Bedside and Verbal shift change report given to Anabell Lee RN (oncoming nurse) by Alvarez Villarreal RN (offgoing nurse). Report included the following information SBAR, Kardex, Intake/Output, MAR, Accordion and Recent Results.

## 2019-06-19 NOTE — PROGRESS NOTES
Daily Progress Note: 2019  Hood Curiel MD    Assessment/Plan:   Duodenal Ulcer/Coffee ground emesis (2019)    -IV PPI BID   -type and screen; serial hemoglobin   -IVF's       Leukocytosis (2019) - reactive? No clear source of infection   -UA not c/w UTI   -check CXR in the event that pt may have aspirated some vomitus though less likely   -monitor with hydration      PUD - h/o. Likely with recurrence  -IV PPI BID for now   -GI as above          Problem List:  Problem List as of 2019 Date Reviewed: 2019          Codes Class Noted - Resolved    * (Principal) Coffee ground emesis ICD-10-CM: K92.0  ICD-9-CM: 578.0  2019 - Present        Leukocytosis ICD-10-CM: M80.118  ICD-9-CM: 288.60  2019 - Present              Subjective:    43 y.o. female with PMH of PUD x 2 (1st episode neg for H.pylori, 2nd episode (+)) admitted for coffee ground emesis. Per pt, at roughly 11AM had coffee ground emesis x 1. Has not noted melanotic stools. Denies NSAID use, steroids, caffeine, alcohol use. Currently no epigastric pain. :  Upper endoscopy  reveled ulcer in duodenal bulb - injected and cauterized. She denies pain. Not hungry. Feeling a little better. Hb 9.3. She states with her last ulcer she required 4 units of blood.         Review of Systems:   A comprehensive review of systems was negative except for that written in the HPI. Objective:   Physical Exam:   Visit Vitals  /69 (BP 1 Location: Right arm, BP Patient Position: At rest)   Pulse (!) 122   Temp 99.3 °F (37.4 °C)   Resp 18   Ht 5' 5\" (1.651 m)   Wt 108.9 kg (240 lb)   LMP 06/15/2019 (Exact Date)   SpO2 96%   Breastfeeding? No   BMI 39.94 kg/m²    O2 Flow Rate (L/min): 2 l/min O2 Device: Room air  Temp (24hrs), Av.7 °F (37.1 °C), Min:98 °F (36.7 °C), Max:99.5 °F (37.5 °C)    No intake/output data recorded.    701 -  1900  In: 250 [I.V.:250]  Out: -   General:  Alert, cooperative, no distress, appears stated age. Head:  Normocephalic, without obvious abnormality, atraumatic. Eyes:  Conjunctivae/corneas clear. PERRL, EOMs intact. Nose: Nares normal. Septum midline. Mucosa normal. No drainage or sinus tenderness. Throat: Lips, mucosa, and tongue moist..   Neck: Supple, symmetrical, trachea midline, no adenopathy, thyroid: no enlargement/tenderness/nodules, no carotid bruit and no JVD. Back:   Symmetric, no curvature. ROM normal. No CVA tenderness. Lungs:   Clear to auscultation bilaterally. Chest wall:  No tenderness or deformity. Heart:  Regular rate and rhythm, S1, S2 normal, no murmur, click, rub or gallop. Abdomen:   Soft, non-tender. Bowel sounds normal. No masses,  No organomegaly. Extremities: no cyanosis or edema. No calf tenderness or cords. Pulses: 2+ and symmetric all extremities. Skin: Skin color, texture, turgor normal. No rashes or lesions   Neurologic: CNII-XII intact. Alert and oriented X 3. Fine motor of hands and fingers normal.   equal.  No cogwheeling or rigidity. Gait not tested at this time. Sensation grossly normal to touch. Gross motor of extremities normal.       Data Review:       Recent Days:  Recent Labs     06/19/19  0254 06/18/19  1305   WBC  --  13.5*   HGB 9.3* 11.7   HCT  --  34.8*   PLT  --  304     Recent Labs     06/19/19  0254 06/18/19  1305    142   K 3.5 4.1   * 110*   CO2 25 29   * 115*   BUN 22* 25*   CREA 0.56 0.64   CA 8.6 9.0   MG 2.1  --    ALB  --  3.9   TBILI  --  0.3   SGOT  --  13*   ALT  --  21     No results for input(s): PH, PCO2, PO2, HCO3, FIO2 in the last 72 hours.     24 Hour Results:  Recent Results (from the past 24 hour(s))   CBC WITH AUTOMATED DIFF    Collection Time: 06/18/19  1:05 PM   Result Value Ref Range    WBC 13.5 (H) 3.6 - 11.0 K/uL    RBC 4.08 3.80 - 5.20 M/uL    HGB 11.7 11.5 - 16.0 g/dL    HCT 34.8 (L) 35.0 - 47.0 %    MCV 85.3 80.0 - 99.0 FL    MCH 28.7 26.0 - 34.0 PG    MCHC 33.6 30.0 - 36.5 g/dL    RDW 12.8 11.5 - 14.5 %    PLATELET 727 636 - 397 K/uL    MPV 10.2 8.9 - 12.9 FL    NRBC 0.0 0  WBC    ABSOLUTE NRBC 0.00 0.00 - 0.01 K/uL    NEUTROPHILS 79 (H) 32 - 75 %    LYMPHOCYTES 16 12 - 49 %    MONOCYTES 5 5 - 13 %    EOSINOPHILS 0 0 - 7 %    BASOPHILS 0 0 - 1 %    IMMATURE GRANULOCYTES 0 0.0 - 0.5 %    ABS. NEUTROPHILS 10.5 (H) 1.8 - 8.0 K/UL    ABS. LYMPHOCYTES 2.2 0.8 - 3.5 K/UL    ABS. MONOCYTES 0.7 0.0 - 1.0 K/UL    ABS. EOSINOPHILS 0.1 0.0 - 0.4 K/UL    ABS. BASOPHILS 0.0 0.0 - 0.1 K/UL    ABS. IMM. GRANS. 0.0 0.00 - 0.04 K/UL    DF AUTOMATED     METABOLIC PANEL, COMPREHENSIVE    Collection Time: 06/18/19  1:05 PM   Result Value Ref Range    Sodium 142 136 - 145 mmol/L    Potassium 4.1 3.5 - 5.1 mmol/L    Chloride 110 (H) 97 - 108 mmol/L    CO2 29 21 - 32 mmol/L    Anion gap 3 (L) 5 - 15 mmol/L    Glucose 115 (H) 65 - 100 mg/dL    BUN 25 (H) 6 - 20 MG/DL    Creatinine 0.64 0.55 - 1.02 MG/DL    BUN/Creatinine ratio 39 (H) 12 - 20      GFR est AA >60 >60 ml/min/1.73m2    GFR est non-AA >60 >60 ml/min/1.73m2    Calcium 9.0 8.5 - 10.1 MG/DL    Bilirubin, total 0.3 0.2 - 1.0 MG/DL    ALT (SGPT) 21 12 - 78 U/L    AST (SGOT) 13 (L) 15 - 37 U/L    Alk.  phosphatase 60 45 - 117 U/L    Protein, total 7.7 6.4 - 8.2 g/dL    Albumin 3.9 3.5 - 5.0 g/dL    Globulin 3.8 2.0 - 4.0 g/dL    A-G Ratio 1.0 (L) 1.1 - 2.2     LIPASE    Collection Time: 06/18/19  1:05 PM   Result Value Ref Range    Lipase 93 73 - 393 U/L   URINALYSIS W/MICROSCOPIC    Collection Time: 06/18/19  1:05 PM   Result Value Ref Range    Color YELLOW/STRAW      Appearance CLEAR CLEAR      Specific gravity 1.022 1.003 - 1.030      pH (UA) 7.0 5.0 - 8.0      Protein TRACE (A) NEG mg/dL    Glucose NEGATIVE  NEG mg/dL    Ketone 40 (A) NEG mg/dL    Bilirubin NEGATIVE  NEG      Blood NEGATIVE  NEG      Urobilinogen 1.0 0.2 - 1.0 EU/dL    Nitrites NEGATIVE  NEG      Leukocyte Esterase SMALL (A) NEG WBC 5-10 0 - 4 /hpf    RBC 0-5 0 - 5 /hpf    Epithelial cells FEW FEW /lpf    Bacteria NEGATIVE  NEG /hpf    Hyaline cast 0-2 0 - 5 /lpf   URINE CULTURE HOLD SAMPLE    Collection Time: 06/18/19  1:05 PM   Result Value Ref Range    Urine culture hold        URINE ON HOLD IN MICROBIOLOGY DEPT FOR 3 DAYS. IF UNPRESERVED URINE IS SUBMITTED, IT CANNOT BE USED FOR ADDITIONAL TESTING AFTER 24 HRS, RECOLLECTION WILL BE REQUIRED.    TYPE & SCREEN    Collection Time: 06/18/19  1:05 PM   Result Value Ref Range    Crossmatch Expiration 06/21/2019     ABO/Rh(D) A POSITIVE     Antibody screen NEG    HCG URINE, QL. - POC    Collection Time: 06/18/19  1:21 PM   Result Value Ref Range    Pregnancy test,urine (POC) NEGATIVE  NEG     OCCULT BLOOD, STOOL    Collection Time: 06/18/19  1:42 PM   Result Value Ref Range    Occult blood, stool NEGATIVE  NEG     EKG, 12 LEAD, INITIAL    Collection Time: 06/18/19  2:23 PM   Result Value Ref Range    Ventricular Rate 94 BPM    Atrial Rate 94 BPM    P-R Interval 168 ms    QRS Duration 82 ms    Q-T Interval 360 ms    QTC Calculation (Bezet) 450 ms    Calculated P Axis 49 degrees    Calculated R Axis 21 degrees    Calculated T Axis 33 degrees    Diagnosis       Normal sinus rhythm  Normal ECG  No previous ECGs available  Confirmed by Laurel Sanchez MD., Brant ([id number]) on 6/18/2019 6:21:17 PM     HEMOGLOBIN    Collection Time: 06/19/19  2:54 AM   Result Value Ref Range    HGB 9.3 (L) 11.5 - 97.9 g/dL   METABOLIC PANEL, BASIC    Collection Time: 06/19/19  2:54 AM   Result Value Ref Range    Sodium 144 136 - 145 mmol/L    Potassium 3.5 3.5 - 5.1 mmol/L    Chloride 112 (H) 97 - 108 mmol/L    CO2 25 21 - 32 mmol/L    Anion gap 7 5 - 15 mmol/L    Glucose 103 (H) 65 - 100 mg/dL    BUN 22 (H) 6 - 20 MG/DL    Creatinine 0.56 0.55 - 1.02 MG/DL    BUN/Creatinine ratio 39 (H) 12 - 20      GFR est AA >60 >60 ml/min/1.73m2    GFR est non-AA >60 >60 ml/min/1.73m2    Calcium 8.6 8.5 - 10.1 MG/DL   MAGNESIUM    Collection Time: 06/19/19  2:54 AM   Result Value Ref Range    Magnesium 2.1 1.6 - 2.4 mg/dL       Medications reviewed  Current Facility-Administered Medications   Medication Dose Route Frequency    pantoprazole (PROTONIX) 40 mg in sodium chloride 0.9% 10 mL injection  40 mg IntraVENous Q12H    0.9% sodium chloride infusion  125 mL/hr IntraVENous CONTINUOUS    sodium chloride (NS) flush 5-40 mL  5-40 mL IntraVENous Q8H    sodium chloride (NS) flush 5-40 mL  5-40 mL IntraVENous PRN    acetaminophen (TYLENOL) tablet 650 mg  650 mg Oral Q4H PRN    naloxone (NARCAN) injection 0.4 mg  0.4 mg IntraVENous PRN    ondansetron (ZOFRAN ODT) tablet 4 mg  4 mg Oral Q4H PRN    EPINEPHrine (ADRENALIN) 0.1 mg/mL syringe 1 mg  1 mg IntraVENous PRN       Care Plan discussed with: Patient/Family and Nurse    Total time spent with patient: 30 minutes.     Daniel Sanchez MD

## 2019-06-19 NOTE — PROGRESS NOTES
Per chart review, GI following. No current discharge recommendation at this time.        Yeny Huynh, [facility], [address] [place] [address]

## 2019-06-20 LAB
H PYLORI IGA SER-ACNC: <9 UNITS (ref 0–8.9)
H PYLORI IGG SER IA-ACNC: 3.64 INDEX VALUE (ref 0–0.79)
HGB BLD-MCNC: 8.1 G/DL (ref 11.5–16)
HGB BLD-MCNC: 8.3 G/DL (ref 11.5–16)

## 2019-06-20 PROCEDURE — 74011250636 HC RX REV CODE- 250/636: Performed by: INTERNAL MEDICINE

## 2019-06-20 PROCEDURE — 85018 HEMOGLOBIN: CPT

## 2019-06-20 PROCEDURE — 65660000000 HC RM CCU STEPDOWN

## 2019-06-20 PROCEDURE — C9113 INJ PANTOPRAZOLE SODIUM, VIA: HCPCS | Performed by: INTERNAL MEDICINE

## 2019-06-20 PROCEDURE — 36415 COLL VENOUS BLD VENIPUNCTURE: CPT

## 2019-06-20 PROCEDURE — 99218 HC RM OBSERVATION: CPT

## 2019-06-20 RX ADMIN — PANTOPRAZOLE SODIUM 40 MG: 40 INJECTION, POWDER, FOR SOLUTION INTRAVENOUS at 21:13

## 2019-06-20 RX ADMIN — Medication 10 ML: at 05:09

## 2019-06-20 RX ADMIN — Medication 10 ML: at 21:13

## 2019-06-20 RX ADMIN — Medication 10 ML: at 13:16

## 2019-06-20 RX ADMIN — PANTOPRAZOLE SODIUM 40 MG: 40 INJECTION, POWDER, FOR SOLUTION INTRAVENOUS at 08:56

## 2019-06-20 NOTE — PROGRESS NOTES
Daily Progress Note: 2019  Ruddy Norman MD    Assessment/Plan:   Duodenal Ulcer/Coffee ground emesis (2019)    -IV PPI BID   -type and screen; serial hemoglobin   -stool h- pylori pending      Leukocytosis (2019) - reactive? No clear source of infection   -UA not c/w UTI   -check CXR in the event that pt may have aspirated some vomitus though less likely   -monitor with hydration      PUD - h/o. Likely with recurrence  -IV PPI BID for now   -GI as above          Problem List:  Problem List as of 2019 Date Reviewed: 2019          Codes Class Noted - Resolved    * (Principal) Coffee ground emesis ICD-10-CM: K92.0  ICD-9-CM: 578.0  2019 - Present        Leukocytosis ICD-10-CM: R97.415  ICD-9-CM: 288.60  2019 - Present              Subjective:    43 y.o. female with PMH of PUD x 2 (1st episode neg for H.pylori, 2nd episode (+)) admitted for coffee ground emesis. Per pt, at roughly 11AM had coffee ground emesis x 1. Has not noted melanotic stools. Denies NSAID use, steroids, caffeine, alcohol use. Currently no epigastric pain. :  Upper endoscopy  reveled ulcer in duodenal bulb - injected and cauterized. She denies pain. Not hungry. Feeling a little better. Hb 9.3. She states with her last ulcer she required 4 units of blood.      : Tolerated clears well. Hb 8.1. No further bleeding.    Review of Systems:   A comprehensive review of systems was negative except for that written in the HPI. Objective:   Physical Exam:   Visit Vitals  /64 (BP 1 Location: Right arm, BP Patient Position: At rest)   Pulse 82   Temp 98.1 °F (36.7 °C)   Resp 18   Ht 5' 5\" (1.651 m)   Wt 240 lb (108.9 kg)   LMP 06/15/2019 (Exact Date)   SpO2 98%   Breastfeeding?  No   BMI 39.94 kg/m²    O2 Flow Rate (L/min): 2 l/min O2 Device: Room air  Temp (24hrs), Av.1 °F (36.7 °C), Min:97.7 °F (36.5 °C), Max:98.5 °F (36.9 °C)    1901 -  07  In: 360 [P.O.:360]  Out: -    06/18 0701 - 06/19 1900  In: 250 [I.V.:250]  Out: -   General:  Alert, cooperative, no distress, appears stated age. Head:  Normocephalic, without obvious abnormality, atraumatic. Eyes:  Conjunctivae/corneas clear. PERRL, EOMs intact. Nose: Nares normal. Septum midline. Mucosa normal. No drainage or sinus tenderness. Throat: Lips, mucosa, and tongue moist..   Neck: Supple, symmetrical, trachea midline, no adenopathy, thyroid: no enlargement/tenderness/nodules, no carotid bruit and no JVD. Back:   Symmetric, no curvature. ROM normal. No CVA tenderness. Lungs:   Clear to auscultation bilaterally. Chest wall:  No tenderness or deformity. Heart:  Regular rate and rhythm, S1, S2 normal, no murmur, click, rub or gallop. Abdomen:   Soft, non-tender. Bowel sounds normal. No masses,  No organomegaly. Extremities: no cyanosis or edema. No calf tenderness or cords. Pulses: 2+ and symmetric all extremities. Skin: Skin color, texture, turgor normal. No rashes or lesions   Neurologic: CNII-XII intact. Alert and oriented X 3. Fine motor of hands and fingers normal.   equal.  No cogwheeling or rigidity. Gait not tested at this time. Sensation grossly normal to touch. Gross motor of extremities normal.       Data Review:       Recent Days:  Recent Labs     06/20/19  0510 06/19/19  0254 06/18/19  1305   WBC  --   --  13.5*   HGB 8.1* 9.3* 11.7   HCT  --   --  34.8*   PLT  --   --  304     Recent Labs     06/19/19  0254 06/18/19  1305    142   K 3.5 4.1   * 110*   CO2 25 29   * 115*   BUN 22* 25*   CREA 0.56 0.64   CA 8.6 9.0   MG 2.1  --    ALB  --  3.9   TBILI  --  0.3   SGOT  --  13*   ALT  --  21     No results for input(s): PH, PCO2, PO2, HCO3, FIO2 in the last 72 hours.     24 Hour Results:  Recent Results (from the past 24 hour(s))   HEMOGLOBIN    Collection Time: 06/20/19  5:10 AM   Result Value Ref Range    HGB 8.1 (L) 11.5 - 16.0 g/dL Medications reviewed  Current Facility-Administered Medications   Medication Dose Route Frequency    pantoprazole (PROTONIX) 40 mg in sodium chloride 0.9% 10 mL injection  40 mg IntraVENous Q12H    sodium chloride (NS) flush 5-40 mL  5-40 mL IntraVENous Q8H    sodium chloride (NS) flush 5-40 mL  5-40 mL IntraVENous PRN    acetaminophen (TYLENOL) tablet 650 mg  650 mg Oral Q4H PRN    naloxone (NARCAN) injection 0.4 mg  0.4 mg IntraVENous PRN    ondansetron (ZOFRAN ODT) tablet 4 mg  4 mg Oral Q4H PRN    EPINEPHrine (ADRENALIN) 0.1 mg/mL syringe 1 mg  1 mg IntraVENous PRN       Care Plan discussed with: Patient/Family and Nurse    Total time spent with patient: 30 minutes.     Esme Brennan MD

## 2019-06-20 NOTE — PROGRESS NOTES
Bedside shift change report given to Brenda Henderson RN (oncoming nurse) by Iván Beltran RN (offgoing nurse). Report included the following information SBAR and Kardex.

## 2019-06-20 NOTE — PROGRESS NOTES
Gastroenterology Progress Note    2019  Admit Date: 2019         Narrative Assessment and Plan   · Hematemesis - resolved  · Bleeding duodenal ulcer s/p EGD with epinephrine injection and cautery  · Anemia - Hgb down to 8.1 but no further signs of bleeding    Plan  - H pylori stool antigen pending  - continue IV PPI  - monitor H/H and transfuse as needed  - repeat labs in AM  - agree with full liquids  - following      Subjective:   · Doing well. Ambulated in hallways without difficulty. Tolerated clear liquids. Denies abdominal pain, nausea or vomiting. No BM.    ROS:  The previous review of systems on initial consultation / H&P is noted and reviewed. Specific changes noted above in HPI. Current Medications:     Current Facility-Administered Medications   Medication Dose Route Frequency    pantoprazole (PROTONIX) 40 mg in sodium chloride 0.9% 10 mL injection  40 mg IntraVENous Q12H    sodium chloride (NS) flush 5-40 mL  5-40 mL IntraVENous Q8H    sodium chloride (NS) flush 5-40 mL  5-40 mL IntraVENous PRN    acetaminophen (TYLENOL) tablet 650 mg  650 mg Oral Q4H PRN    naloxone (NARCAN) injection 0.4 mg  0.4 mg IntraVENous PRN    ondansetron (ZOFRAN ODT) tablet 4 mg  4 mg Oral Q4H PRN    EPINEPHrine (ADRENALIN) 0.1 mg/mL syringe 1 mg  1 mg IntraVENous PRN       Objective:     VITALS:   Last 24hrs VS reviewed since prior progress note. Most recent are:  Visit Vitals  /66 (BP 1 Location: Right arm, BP Patient Position: At rest)   Pulse 87   Temp 98 °F (36.7 °C)   Resp 16   Ht 5' 5\" (1.651 m)   Wt 108.9 kg (240 lb)   SpO2 98%   Breastfeeding?  No   BMI 39.94 kg/m²     Temp (24hrs), Av.1 °F (36.7 °C), Min:97.7 °F (36.5 °C), Max:98.5 °F (36.9 °C)      Intake/Output Summary (Last 24 hours) at 2019 1113  Last data filed at 2019 0400  Gross per 24 hour   Intake 360 ml   Output    Net 360 ml       EXAM:  General:          Comfortable, no distress    HEENT: Atraumatic skull, pupils equal  Lungs:  No abnormal audible breath sounds. Speaking in complete sentences  Heart:  No abnormal audible heart sounds. Well perfused  Abdomen: Nondistended, nontender. No mass, guarding or rebound  Neurologic:  Cranial nerves grossly intact, moves all 4 extremities  Psych:   Good insight. Not anxious nor agitated    Lab Data Reviewed:   Recent Labs     06/20/19  0510 06/19/19  0254 06/18/19  1305   WBC  --   --  13.5*   HGB 8.1* 9.3* 11.7   HCT  --   --  34.8*   PLT  --   --  304     Recent Labs     06/19/19  0254 06/18/19  1305    142   K 3.5 4.1   * 110*   CO2 25 29   * 115*   BUN 22* 25*   CREA 0.56 0.64   CA 8.6 9.0   MG 2.1  --    ALB  --  3.9   TBILI  --  0.3   SGOT  --  13*   ALT  --  21     No results found for: GLUCPOC  No results for input(s): PH, PCO2, PO2, HCO3, FIO2 in the last 72 hours. No results for input(s): INR in the last 72 hours.     No lab exists for component: INREXT, INREXT        Assessment:   (See above)  Principal Problem:    Coffee ground emesis (6/18/2019)    Active Problems:    Leukocytosis (6/18/2019)        Plan:   (See above)    Signed By:  Nora Pedersen PA-C  6/20/2019  10:53 AM  Rigo Harper MD

## 2019-06-20 NOTE — CDMP QUERY
Pt admitted with an acute bleeding duodenal ulcer  And Pt noted to have anemia. If possible, please document in the progress notes and d/c summary if you are evaluating and / or treating any of the following: 
 
=> acute blood loss anemia  2/2 acute bleeding duodenal ulcer  
=> acute drop in hemoglobin 2/2 acute bleeding duodenal ulcer  
=> anemia due to chronic disease, please specify disease 
=>  Other, please specify 
=> Clinically unable to determine The medical record reflects the following: 
   Risk Factors: 44 yo F admitted with acute duodenal ulcer with bleeding Clinical Indicators: on admit hbg 11.7   6/20 hbg 8.1 Treatment: egd with cautery, lab monitoring Thank you for your time [facility] RN/BSN, CCDS Desk:   [phone] Other:  [phone]

## 2019-06-20 NOTE — PHYSICIAN ADVISORY
Letter of Status Determination:  
Recommend hospitalization status upgraded from OBSERVATION  to INPATIENT  Status Pt Name:  Susi Hidalgo MR#  
KEVIN # [id number] / 
[id number] Payor: Hellen Gonzalez / Plan: Marie Pugh Se HMO / Product Type: HMO /   
JARED#  [id number] Room and Hospital  536/01  @ [address] [facility] Hospitalization date  6/18/2019 12:54 PM  
Current Attending Physician  Sadia Crawford MD  
Principal diagnosis  Coffee ground emesis [K92.0] Clinicals  43 y.o. y.o  female hospitalized with above diagnosis This pt is now diagnosed with Bleeding Duodenal ulcer leading to acute blood loss anemia, hematemesis and other issues. [place] (MCG) criteria Does  apply Upper GI bleed STATUS DETERMINATION  Based on documented presenting clinical data, comorbid conditions, high risk of adverse events and deterioration, it is our recommendation that the patient's status should be upgraded from OBSERVATION to INPATIENT status. The final decision of the patient's hospitalization status depends on the attending physician's judgment. Additional comments Payor: Hellen Gonzalez / Plan: Marie Pugh Se HMO / Product Type: HMO /   
  
 
Robbin Kan MD MPH FACP Cell: [phone] Physician Advisor [address]  
   
Cell  [phone]   
 
 
[phone] Jignesh Da Silva

## 2019-06-20 NOTE — CDMP QUERY
Patient admitted with an acute duodenal ulcer with bleeding & Noted documentation leukocytosis. Please provide clinical indicators/treatment to support this diagnosis. 
 
=> SIRS POA due to noninfectious process  
=> SIRS unknown cause 
=> other explanation  
=> unable to determine The medical record reflects the following:   
   Risk Factors: 42 yo F admitted with  acute duodenal ulcer with bleeding Clinical Indicators: on admit WBC 13.5 Neuts 79%  Pulse 103 RR 40 Treatment: IV NS 1 L Bol[place] Thank you for your time [facility] RN/BSN, CCDS Desk:   [phone] Other:  [phone]

## 2019-06-20 NOTE — ROUTINE PROCESS
Bedside and Verbal shift change report given to Timothy Garay RN (oncoming nurse) by Diego Ulloa RN (offgoing nurse). Report included the following information SBAR, Kardex, ED Summary, Intake/Output, MAR and Recent Results.

## 2019-06-21 VITALS
TEMPERATURE: 98.2 F | BODY MASS INDEX: 39.99 KG/M2 | SYSTOLIC BLOOD PRESSURE: 102 MMHG | HEART RATE: 93 BPM | RESPIRATION RATE: 16 BRPM | DIASTOLIC BLOOD PRESSURE: 56 MMHG | HEIGHT: 65 IN | OXYGEN SATURATION: 99 % | WEIGHT: 240 LBS

## 2019-06-21 LAB
ALBUMIN SERPL-MCNC: 3.4 G/DL (ref 3.5–5)
ALBUMIN/GLOB SERPL: 1.1 {RATIO} (ref 1.1–2.2)
ALP SERPL-CCNC: 45 U/L (ref 45–117)
ALT SERPL-CCNC: 19 U/L (ref 12–78)
ANION GAP SERPL CALC-SCNC: 6 MMOL/L (ref 5–15)
AST SERPL-CCNC: 15 U/L (ref 15–37)
BASOPHILS # BLD: 0 K/UL (ref 0–0.1)
BASOPHILS NFR BLD: 1 % (ref 0–1)
BILIRUB SERPL-MCNC: 0.3 MG/DL (ref 0.2–1)
BUN SERPL-MCNC: 8 MG/DL (ref 6–20)
BUN/CREAT SERPL: 12 (ref 12–20)
CALCIUM SERPL-MCNC: 8.5 MG/DL (ref 8.5–10.1)
CHLORIDE SERPL-SCNC: 110 MMOL/L (ref 97–108)
CO2 SERPL-SCNC: 26 MMOL/L (ref 21–32)
CREAT SERPL-MCNC: 0.65 MG/DL (ref 0.55–1.02)
DIFFERENTIAL METHOD BLD: ABNORMAL
EOSINOPHIL # BLD: 0.2 K/UL (ref 0–0.4)
EOSINOPHIL NFR BLD: 2 % (ref 0–7)
ERYTHROCYTE [DISTWIDTH] IN BLOOD BY AUTOMATED COUNT: 13.1 % (ref 11.5–14.5)
GLOBULIN SER CALC-MCNC: 3.1 G/DL (ref 2–4)
GLUCOSE SERPL-MCNC: 89 MG/DL (ref 65–100)
HCT VFR BLD AUTO: 24.4 % (ref 35–47)
HCT VFR BLD AUTO: 25.3 % (ref 35–47)
HGB BLD-MCNC: 8.1 G/DL (ref 11.5–16)
HGB BLD-MCNC: 8.4 G/DL (ref 11.5–16)
IMM GRANULOCYTES # BLD AUTO: 0 K/UL (ref 0–0.04)
IMM GRANULOCYTES NFR BLD AUTO: 0 % (ref 0–0.5)
LYMPHOCYTES # BLD: 2.5 K/UL (ref 0.8–3.5)
LYMPHOCYTES NFR BLD: 31 % (ref 12–49)
MCH RBC QN AUTO: 28.5 PG (ref 26–34)
MCHC RBC AUTO-ENTMCNC: 33.2 G/DL (ref 30–36.5)
MCV RBC AUTO: 85.9 FL (ref 80–99)
MONOCYTES # BLD: 0.5 K/UL (ref 0–1)
MONOCYTES NFR BLD: 6 % (ref 5–13)
NEUTS SEG # BLD: 4.8 K/UL (ref 1.8–8)
NEUTS SEG NFR BLD: 60 % (ref 32–75)
NRBC # BLD: 0.03 K/UL (ref 0–0.01)
NRBC BLD-RTO: 0.4 PER 100 WBC
PLATELET # BLD AUTO: 229 K/UL (ref 150–400)
PMV BLD AUTO: 10.2 FL (ref 8.9–12.9)
POTASSIUM SERPL-SCNC: 3.4 MMOL/L (ref 3.5–5.1)
PROT SERPL-MCNC: 6.5 G/DL (ref 6.4–8.2)
RBC # BLD AUTO: 2.84 M/UL (ref 3.8–5.2)
SODIUM SERPL-SCNC: 142 MMOL/L (ref 136–145)
WBC # BLD AUTO: 7.9 K/UL (ref 3.6–11)

## 2019-06-21 PROCEDURE — 85025 COMPLETE CBC W/AUTO DIFF WBC: CPT

## 2019-06-21 PROCEDURE — 74011250637 HC RX REV CODE- 250/637: Performed by: PHYSICIAN ASSISTANT

## 2019-06-21 PROCEDURE — 74011250637 HC RX REV CODE- 250/637: Performed by: INTERNAL MEDICINE

## 2019-06-21 PROCEDURE — 36415 COLL VENOUS BLD VENIPUNCTURE: CPT

## 2019-06-21 PROCEDURE — 85018 HEMOGLOBIN: CPT

## 2019-06-21 PROCEDURE — 77030032490 HC SLV COMPR SCD KNE COVD -B

## 2019-06-21 PROCEDURE — 80053 COMPREHEN METABOLIC PANEL: CPT

## 2019-06-21 PROCEDURE — 74011250637 HC RX REV CODE- 250/637: Performed by: FAMILY MEDICINE

## 2019-06-21 RX ORDER — POLYETHYLENE GLYCOL 3350 17 G/17G
17 POWDER, FOR SOLUTION ORAL DAILY
Status: DISCONTINUED | OUTPATIENT
Start: 2019-06-21 | End: 2019-06-21 | Stop reason: HOSPADM

## 2019-06-21 RX ORDER — PANTOPRAZOLE SODIUM 40 MG/1
40 TABLET, DELAYED RELEASE ORAL
Status: DISCONTINUED | OUTPATIENT
Start: 2019-06-21 | End: 2019-06-21 | Stop reason: HOSPADM

## 2019-06-21 RX ORDER — PANTOPRAZOLE SODIUM 40 MG/1
40 TABLET, DELAYED RELEASE ORAL
Qty: 60 TAB | Refills: 0 | Status: SHIPPED | OUTPATIENT
Start: 2019-06-21

## 2019-06-21 RX ADMIN — POLYETHYLENE GLYCOL 3350 17 G: 17 POWDER, FOR SOLUTION ORAL at 12:12

## 2019-06-21 RX ADMIN — Medication 10 ML: at 06:00

## 2019-06-21 RX ADMIN — PANTOPRAZOLE SODIUM 40 MG: 40 TABLET, DELAYED RELEASE ORAL at 08:57

## 2019-06-21 RX ADMIN — ACETAMINOPHEN 650 MG: 325 TABLET ORAL at 01:39

## 2019-06-21 NOTE — ROUTINE PROCESS
Bedside and Verbal shift change report given to Trever Alva RN (oncoming nurse) by Severino Roach RN (offgoing nurse). Report included the following information SBAR, Kardex and Quality Measures.

## 2019-06-21 NOTE — PROGRESS NOTES
Pharmacist Discharge Medication Reconciliation    Discharge Provider:  Dr. Antonio Edmond patient regarding OTC and Rx medications that may increase the risk of bleeding on patient request       Discharge Medications:      My Medications        START taking these medications        Instructions Each Dose to Equal Morning Noon Evening Bedtime   pantoprazole 40 mg tablet  Commonly known as:  PROTONIX      Take 1 Tab by mouth Before breakfast and dinner. 40 mg                CONTINUE taking these medications        Instructions Each Dose to Equal Morning Noon Evening Bedtime   acetaminophen 325 mg tablet  Commonly known as:  TYLENOL      Take 325 mg by mouth every six (6) hours as needed for Pain. 325 mg         fexofenadine 180 mg tablet  Commonly known as:  ALLEGRA      Take 180 mg by mouth daily.    180 mg                STOP taking these medications      DEXILANT 60 mg Cpdb capsule (delayed release)  Generic drug:  dexlansoprazole                  Where to Get Your Medications        These medications were sent to [facility]/pharmacy #[id number], [address] [facility], [address] [place] [address]      Phone:  [phone]   pantoprazole 40 mg tablet       Thanks,   JOSELYN Najera,   Contact: [phone]

## 2019-06-21 NOTE — DISCHARGE INSTRUCTIONS
Patient Discharge Instructions    Hermann Gagnon / [id number] : 1980    Admitted 2019 Discharged: 2019 1:18 PM     ACUTE DIAGNOSES:  Coffee ground emesis [K92.0]  Coffee ground emesis [K92.0]    CHRONIC MEDICAL DIAGNOSES:  Problem List as of 2019 Date Reviewed: 2019          Codes Class Noted - Resolved    * (Principal) Coffee ground emesis ICD-10-CM: K92.0  ICD-9-CM: 578.0  2019 - Present        Leukocytosis ICD-10-CM: D72.829  ICD-9-CM: 288.60  2019 - Present              DISCHARGE MEDICATIONS:         · It is important that you take the medication exactly as they are prescribed. · Keep your medication in the bottles provided by the pharmacist and keep a list of the medication names, dosages, and times to be taken in your wallet. · Do not take other medications without consulting your doctor. DIET:  Regular Diet    ACTIVITY: Activity as tolerated    ADDITIONAL INFORMATION: If you experience any of the following symptoms then please call your primary care physician or return to the emergency room if you cannot get hold of your doctor: Fever, chills, nausea, vomiting, diarrhea, change in mentation, falling, bleeding, shortness of breath. FOLLOW UP CARE:  Dr. Nahid Sorensen, Jesus Garcia MD  you are to call and set up an appointment to see them with in 1 week. Follow-up with specialists at directed by them    No NSAIDs such as naprosyn, advil, aleve, motrin      Information obtained by :  I understand that if any problems occur once I am at home I am to contact my physician. I understand and acknowledge receipt of the instructions indicated above.                                                                                                                                            Physician's or R.N.'s Signature                                                                  Date/Time Patient or Representative Signature                                                          Date/Time

## 2019-06-21 NOTE — PROGRESS NOTES
Patient visited by [facility] Partner Volunteer on Medical Surgical Unit on 6/21/19. Rev.  Elder Diop MDiv, ThM, [address] [facility] paging service: [phone])

## 2019-06-21 NOTE — DISCHARGE SUMMARY
Physician Discharge Summary     Patient ID:    Bria Peterson  [id number]  42 y.o.  1980  Bulmaro Infante MD    Admit date: 6/18/2019    Discharge date and time: 6/21/2019    Admission Diagnoses: Coffee ground emesis [K92.0]; Coffee ground emesis [K92.0]    Chronic Diagnoses:    Problem List as of 6/21/2019 Date Reviewed: 6/18/2019          Codes Class Noted - Resolved    * (Principal) Coffee ground emesis ICD-10-CM: K92.0  ICD-9-CM: 578.0  6/18/2019 - Present        Leukocytosis ICD-10-CM: D72.829  ICD-9-CM: 288.60  6/18/2019 - Present              Discharge Medications:   Current Discharge Medication List      START taking these medications    Details   pantoprazole (PROTONIX) 40 mg tablet Take 1 Tab by mouth Before breakfast and dinner. Qty: 60 Tab, Refills: 0         CONTINUE these medications which have NOT CHANGED    Details   fexofenadine (ALLEGRA) 180 mg tablet Take 180 mg by mouth daily. acetaminophen (TYLENOL) 325 mg tablet Take 325 mg by mouth every six (6) hours as needed for Pain. STOP taking these medications       dexlansoprazole (DEXILANT) 60 mg CpDB capsule (delayed release) Comments:   Reason for Stopping: Follow up Care:    1. Bulmaro Infante MD with in 1 weeks- Needs CBC  2. GI specialists as directed. 4-6 weeks    Diet:  Regular Diet    Disposition:  Home.     Advanced Directive:    Discharge Exam:  [See today's progress note.]    CONSULTATIONS: GI    Significant Diagnostic Studies:   Recent Labs     06/21/19  0850 06/21/19  0016   WBC  --  7.9   HGB 8.4* 8.1*   HCT 25.3* 24.4*   PLT  --  229     Recent Labs     06/21/19  0016 06/19/19  0254    144   K 3.4* 3.5   * 112*   CO2 26 25   BUN 8 22*   CREA 0.65 0.56   GLU 89 103*   CA 8.5 8.6   MG  --  2.1     Recent Labs     06/21/19  0016   SGOT 15   ALT 19   AP 45   TBILI 0.3   TP 6.5   ALB 3.4*   GLOB 3.1           HOSPITAL COURSE & TREATMENT RENDERED:   Duodenal Ulcer/Coffee ground emesis (2019)    -IV PPI BID   -type and screen; serial hemoglobin   -stool h- pylori IgA neg and IgG pos- she has been treated in the past     Blood loss Anemia  -monitor  -Hb stable this am at 8.1      Leukocytosis (2019) - reactive? No clear source of infection- does not have SIRS  -UA not c/w UTI   -check CXR in the event that pt may have aspirated some vomitus though less likely   -monitor with hydration      PUD - h/o. Likely with recurrence  -IV PPI BID for now   -GI as above              Subjective:    43 y.o. female with PMH of PUD x 2 (1st episode neg for H.pylori, 2nd episode (+)) admitted for coffee ground emesis. Per pt, at roughly 11AM had coffee ground emesis x 1. Has not noted melanotic stools. Denies NSAID use, steroids, caffeine, alcohol use. Currently no epigastric pain.      :  Upper endoscopy  reveled ulcer in duodenal bulb - injected and cauterized. She denies pain. Not hungry. Feeling a little better. Hb 9.3. She states with her last ulcer she required 4 units of blood.      : Tolerated clears well. Hb 8.1. No further bleeding.       : Hb stable. Tolerated full liquids. No further bleeding. Will advance to soft diet. Slept well. Review of Systems:   A comprehensive review of systems was negative except for that written in the HPI.     Objective:   Physical Exam:   Visit Vitals  /66 (BP 1 Location: Right arm, BP Patient Position: At rest)   Pulse 78   Temp 98.5 °F (36.9 °C)   Resp 16   Ht 5' 5\" (1.651 m)   Wt 240 lb (108.9 kg)   LMP 06/15/2019 (Exact Date)   SpO2 98%   Breastfeeding? No   BMI 39.94 kg/m²    O2 Flow Rate (L/min): 2 l/min O2 Device: Room air  Temp (24hrs), Av.4 °F (36.9 °C), Min:98 °F (36.7 °C), Max:98.7 °F (37.1 °C)    No intake/output data recorded.  0701 -  1900  In: 360 [P.O.:360]  Out: -   General:  Alert, cooperative, no distress, appears stated age. Head:  Normocephalic, without obvious abnormality, atraumatic.    Eyes: Conjunctivae/corneas clear. PERRL, EOMs intact. Nose: Nares normal. Septum midline. Mucosa normal. No drainage or sinus tenderness. Throat: Lips, mucosa, and tongue moist..   Neck: Supple, symmetrical, trachea midline, no adenopathy, thyroid: no enlargement/tenderness/nodules, no carotid bruit and no JVD. Back:   Symmetric, no curvature. ROM normal. No CVA tenderness. Lungs:   Clear to auscultation bilaterally. Chest wall:  No tenderness or deformity. Heart:  Regular rate and rhythm, S1, S2 normal, no murmur, click, rub or gallop. Abdomen:   Soft, non-tender. Bowel sounds normal. No masses,  No organomegaly. Extremities: no cyanosis or edema. No calf tenderness or cords. Pulses: 2+ and symmetric all extremities. Skin: Skin color, texture, turgor normal. No rashes or lesions   Neurologic: CNII-XII intact. Alert and oriented X 3. Fine motor of hands and fingers normal.   equal.  No cogwheeling or rigidity. Gait not tested at this time. Sensation grossly normal to touch.   Gross motor of extremities normal             Signed:  Carolyne Benites MD  6/21/2019  1:19 PM

## 2019-06-21 NOTE — PROGRESS NOTES
Daily Progress Note: 6/21/2019  Yina Jj MD    Assessment/Plan:   Duodenal Ulcer/Coffee ground emesis (6/18/2019)    -IV PPI BID   -type and screen; serial hemoglobin   -stool h- pylori IgA neg and IgG pos- she has been treated in the past    Blood loss Anemia  -monitor  -Hb stable this am at 8.1      Leukocytosis (6/18/2019) - reactive? No clear source of infection- does not have SIRS  -UA not c/w UTI   -check CXR in the event that pt may have aspirated some vomitus though less likely   -monitor with hydration      PUD - h/o. Likely with recurrence  -IV PPI BID for now   -GI as above          Problem List:  Problem List as of 6/21/2019 Date Reviewed: 6/18/2019          Codes Class Noted - Resolved    * (Principal) Coffee ground emesis ICD-10-CM: K92.0  ICD-9-CM: 578.0  6/18/2019 - Present        Leukocytosis ICD-10-CM: F93.689  ICD-9-CM: 288.60  6/18/2019 - Present              Subjective:    43 y.o. female with PMH of PUD x 2 (1st episode neg for H.pylori, 2nd episode (+)) admitted for coffee ground emesis. Per pt, at roughly 11AM had coffee ground emesis x 1. Has not noted melanotic stools. Denies NSAID use, steroids, caffeine, alcohol use. Currently no epigastric pain. 6/19:  Upper endoscopy 6/18 reveled ulcer in duodenal bulb - injected and cauterized. She denies pain. Not hungry. Feeling a little better. Hb 9.3. She states with her last ulcer she required 4 units of blood.      6/20: Tolerated clears well. Hb 8.1. No further bleeding.      6/21: Hb stable. Tolerated full liquids. No further bleeding. Will advance to soft diet. Slept well. Review of Systems:   A comprehensive review of systems was negative except for that written in the HPI.     Objective:   Physical Exam:   Visit Vitals  /66 (BP 1 Location: Right arm, BP Patient Position: At rest)   Pulse 78   Temp 98.5 °F (36.9 °C)   Resp 16   Ht 5' 5\" (1.651 m)   Wt 240 lb (108.9 kg)   LMP 06/15/2019 (Exact Date) SpO2 98%   Breastfeeding? No   BMI 39.94 kg/m²    O2 Flow Rate (L/min): 2 l/min O2 Device: Room air  Temp (24hrs), Av.4 °F (36.9 °C), Min:98 °F (36.7 °C), Max:98.7 °F (37.1 °C)    No intake/output data recorded.  07 -  1900  In: 360 [P.O.:360]  Out: -   General:  Alert, cooperative, no distress, appears stated age. Head:  Normocephalic, without obvious abnormality, atraumatic. Eyes:  Conjunctivae/corneas clear. PERRL, EOMs intact. Nose: Nares normal. Septum midline. Mucosa normal. No drainage or sinus tenderness. Throat: Lips, mucosa, and tongue moist..   Neck: Supple, symmetrical, trachea midline, no adenopathy, thyroid: no enlargement/tenderness/nodules, no carotid bruit and no JVD. Back:   Symmetric, no curvature. ROM normal. No CVA tenderness. Lungs:   Clear to auscultation bilaterally. Chest wall:  No tenderness or deformity. Heart:  Regular rate and rhythm, S1, S2 normal, no murmur, click, rub or gallop. Abdomen:   Soft, non-tender. Bowel sounds normal. No masses,  No organomegaly. Extremities: no cyanosis or edema. No calf tenderness or cords. Pulses: 2+ and symmetric all extremities. Skin: Skin color, texture, turgor normal. No rashes or lesions   Neurologic: CNII-XII intact. Alert and oriented X 3. Fine motor of hands and fingers normal.   equal.  No cogwheeling or rigidity. Gait not tested at this time. Sensation grossly normal to touch. Gross motor of extremities normal.       Data Review:       Recent Days:  Recent Labs     19  0016 19  1533 19  0510  19  1305   WBC 7.9  --   --   --  13.5*   HGB 8.1* 8.3* 8.1*   < > 11.7   HCT 24.4*  --   --   --  34.8*     --   --   --  304    < > = values in this interval not displayed.      Recent Labs     19  0016 19  0254 19  1305    144 142   K 3.4* 3.5 4.1   * 112* 110*   CO2 26 25 29   GLU 89 103* 115*   BUN 8 22* 25*   CREA 0.65 0.56 0.64   CA 8.5 8.6 9.0   MG  --  2.1  --    ALB 3.4*  --  3.9   TBILI 0.3  --  0.3   SGOT 15  --  13*   ALT 19  --  21     No results for input(s): PH, PCO2, PO2, HCO3, FIO2 in the last 72 hours. 24 Hour Results:  Recent Results (from the past 24 hour(s))   HEMOGLOBIN    Collection Time: 06/20/19  3:33 PM   Result Value Ref Range    HGB 8.3 (L) 11.5 - 16.0 g/dL   CBC WITH AUTOMATED DIFF    Collection Time: 06/21/19 12:16 AM   Result Value Ref Range    WBC 7.9 3.6 - 11.0 K/uL    RBC 2.84 (L) 3.80 - 5.20 M/uL    HGB 8.1 (L) 11.5 - 16.0 g/dL    HCT 24.4 (L) 35.0 - 47.0 %    MCV 85.9 80.0 - 99.0 FL    MCH 28.5 26.0 - 34.0 PG    MCHC 33.2 30.0 - 36.5 g/dL    RDW 13.1 11.5 - 14.5 %    PLATELET 262 394 - 005 K/uL    MPV 10.2 8.9 - 12.9 FL    NRBC 0.4 (H) 0  WBC    ABSOLUTE NRBC 0.03 (H) 0.00 - 0.01 K/uL    NEUTROPHILS 60 32 - 75 %    LYMPHOCYTES 31 12 - 49 %    MONOCYTES 6 5 - 13 %    EOSINOPHILS 2 0 - 7 %    BASOPHILS 1 0 - 1 %    IMMATURE GRANULOCYTES 0 0.0 - 0.5 %    ABS. NEUTROPHILS 4.8 1.8 - 8.0 K/UL    ABS. LYMPHOCYTES 2.5 0.8 - 3.5 K/UL    ABS. MONOCYTES 0.5 0.0 - 1.0 K/UL    ABS. EOSINOPHILS 0.2 0.0 - 0.4 K/UL    ABS. BASOPHILS 0.0 0.0 - 0.1 K/UL    ABS. IMM. GRANS. 0.0 0.00 - 0.04 K/UL    DF AUTOMATED     METABOLIC PANEL, COMPREHENSIVE    Collection Time: 06/21/19 12:16 AM   Result Value Ref Range    Sodium 142 136 - 145 mmol/L    Potassium 3.4 (L) 3.5 - 5.1 mmol/L    Chloride 110 (H) 97 - 108 mmol/L    CO2 26 21 - 32 mmol/L    Anion gap 6 5 - 15 mmol/L    Glucose 89 65 - 100 mg/dL    BUN 8 6 - 20 MG/DL    Creatinine 0.65 0.55 - 1.02 MG/DL    BUN/Creatinine ratio 12 12 - 20      GFR est AA >60 >60 ml/min/1.73m2    GFR est non-AA >60 >60 ml/min/1.73m2    Calcium 8.5 8.5 - 10.1 MG/DL    Bilirubin, total 0.3 0.2 - 1.0 MG/DL    ALT (SGPT) 19 12 - 78 U/L    AST (SGOT) 15 15 - 37 U/L    Alk.  phosphatase 45 45 - 117 U/L    Protein, total 6.5 6.4 - 8.2 g/dL    Albumin 3.4 (L) 3.5 - 5.0 g/dL    Globulin 3.1 2.0 - 4.0 g/dL A-G Ratio 1.1 1.1 - 2.2         Medications reviewed  Current Facility-Administered Medications   Medication Dose Route Frequency    pantoprazole (PROTONIX) 40 mg in sodium chloride 0.9% 10 mL injection  40 mg IntraVENous Q12H    sodium chloride (NS) flush 5-40 mL  5-40 mL IntraVENous Q8H    sodium chloride (NS) flush 5-40 mL  5-40 mL IntraVENous PRN    acetaminophen (TYLENOL) tablet 650 mg  650 mg Oral Q4H PRN    naloxone (NARCAN) injection 0.4 mg  0.4 mg IntraVENous PRN    ondansetron (ZOFRAN ODT) tablet 4 mg  4 mg Oral Q4H PRN    EPINEPHrine (ADRENALIN) 0.1 mg/mL syringe 1 mg  1 mg IntraVENous PRN       Care Plan discussed with: Patient/Family and Nurse    Total time spent with patient: 30 minutes.     Amber Tay MD

## 2019-06-21 NOTE — PROGRESS NOTES
Gastroenterology Progress Note    June 21, 2019  Admit Date: 6/18/2019         Narrative Assessment and Plan   · Hematemesis - resolved  · Bleeding duodenal ulcer s/p EGD with epinephrine injection and cautery  · Anemia - Hgb stable, BUN normal and no further signs of bleeding  · Constipation - resume patient home regimen of Miralax  · Hx of H pylori  · H pylori stool antigen not obtained as no BM  · H pylori IgG Ab pos due to prior infection    Plan  - transition to PO PPI (twice daily x 2 weeks then once daily)  - advance diet  - if tolerates diet have no objection to discharge  - if H pylori stool antigen not obtained can arrange for breath test as outpatient  - outpatient follow up visit in 4-6 weeks      Subjective:   · No complaints this AM. Tolerating full liquids. Denies abdominal pain, nausea or vomiting. No BM. States at home she is on Miralax every other day. ROS:  The previous review of systems on initial consultation / H&P is noted and reviewed. Specific changes noted above in HPI. Current Medications:     Current Facility-Administered Medications   Medication Dose Route Frequency    pantoprazole (PROTONIX) tablet 40 mg  40 mg Oral ACB&D    polyethylene glycol (MIRALAX) packet 17 g  17 g Oral DAILY    sodium chloride (NS) flush 5-40 mL  5-40 mL IntraVENous Q8H    sodium chloride (NS) flush 5-40 mL  5-40 mL IntraVENous PRN    acetaminophen (TYLENOL) tablet 650 mg  650 mg Oral Q4H PRN    naloxone (NARCAN) injection 0.4 mg  0.4 mg IntraVENous PRN    ondansetron (ZOFRAN ODT) tablet 4 mg  4 mg Oral Q4H PRN    EPINEPHrine (ADRENALIN) 0.1 mg/mL syringe 1 mg  1 mg IntraVENous PRN       Objective:     VITALS:   Last 24hrs VS reviewed since prior progress note. Most recent are:  Visit Vitals  /65 (BP 1 Location: Right arm, BP Patient Position: At rest)   Pulse 75   Temp 98.3 °F (36.8 °C)   Resp 16   Ht 5' 5\" (1.651 m)   Wt 108.9 kg (240 lb)   SpO2 99%   Breastfeeding?  No   BMI 39.94 kg/m²     Temp (24hrs), Av.4 °F (36.9 °C), Min:98 °F (36.7 °C), Max:98.7 °F (37.1 °C)    No intake or output data in the 24 hours ending 19 0950    EXAM:  General:          Comfortable, no distress    HEENT: Atraumatic skull, pupils equal  Lungs:  No abnormal audible breath sounds. Speaking in complete sentences  Heart:  No abnormal audible heart sounds. Well perfused  Abdomen: Nondistended, nontender. No mass, guarding or rebound  Neurologic:  Cranial nerves grossly intact, moves all 4 extremities  Psych:   Good insight. Not anxious nor agitated    Lab Data Reviewed:   Recent Labs     19  0850 19  0016 19  1533  19  1305   WBC  --  7.9  --   --  13.5*   HGB 8.4* 8.1* 8.3*   < > 11.7   HCT 25.3* 24.4*  --   --  34.8*   PLT  --  229  --   --  304    < > = values in this interval not displayed. Recent Labs     19  0016 19  0254 19  1305    144 142   K 3.4* 3.5 4.1   * 112* 110*   CO2 26 25 29   GLU 89 103* 115*   BUN 8 22* 25*   CREA 0.65 0.56 0.64   CA 8.5 8.6 9.0   MG  --  2.1  --    ALB 3.4*  --  3.9   TBILI 0.3  --  0.3   SGOT 15  --  13*   ALT 19  --  21     No results found for: GLUCPOC  No results for input(s): PH, PCO2, PO2, HCO3, FIO2 in the last 72 hours. No results for input(s): INR in the last 72 hours.     No lab exists for component: INREXT, INREXT        Assessment:   (See above)  Principal Problem:    Coffee ground emesis (2019)    Active Problems:    Leukocytosis (2019)        Plan:   (See above)    Signed By:  Bobby Rider PA-C  2019  9:51 AM  Asher Pretty MD

## 2019-06-21 NOTE — PROGRESS NOTES
I have reviewed discharge instructions with the patient and parent. The patient and parent verbalized understanding. Discharge medications reviewed with patient and mother and appropriate educational materials and side effects teaching were provided.

## (undated) DEVICE — BIPOLAR ELECTROHEMOSTASIS CATHETER: Brand: GOLD PROBE 350CM

## (undated) DEVICE — 1200 GUARD II KIT W/5MM TUBE W/O VAC TUBE: Brand: GUARDIAN

## (undated) DEVICE — SYR 10ML LUER LOK 1/5ML GRAD --

## (undated) DEVICE — KIT COLON W/ 1.1OZ LUB AND 2 END

## (undated) DEVICE — CANNULA CUSH AD W/ 14FT TBG

## (undated) DEVICE — SOLIDIFIER MEDC 1200ML -- CONVERT TO 356117

## (undated) DEVICE — NDL PRT INJ NSAF BLNT 18GX1.5 --

## (undated) DEVICE — KENDALL RADIOLUCENT FOAM MONITORING ELECTRODE -RECTANGULAR SHAPE: Brand: KENDALL

## (undated) DEVICE — BITEBLOCK ENDOSCP 60FR MAXI WHT POLYETH STURDY W/ VELC WVN

## (undated) DEVICE — NEEDLE SCLERO 23GA L4MM CATH L240CM CNTRST SHTH DIA1.8MM